# Patient Record
Sex: FEMALE | Race: WHITE | Employment: UNEMPLOYED | URBAN - METROPOLITAN AREA
[De-identification: names, ages, dates, MRNs, and addresses within clinical notes are randomized per-mention and may not be internally consistent; named-entity substitution may affect disease eponyms.]

---

## 2017-09-13 ENCOUNTER — HOSPITAL ENCOUNTER (OUTPATIENT)
Facility: HOSPITAL | Age: 32
Setting detail: OBSERVATION
Discharge: HOME | End: 2017-09-14
Attending: EMERGENCY MEDICINE | Admitting: INTERNAL MEDICINE
Payer: MEDICAID

## 2017-09-13 ENCOUNTER — APPOINTMENT (OUTPATIENT)
Dept: GENERAL RADIOLOGY | Age: 32
End: 2017-09-13
Attending: EMERGENCY MEDICINE
Payer: MEDICAID

## 2017-09-13 DIAGNOSIS — R07.9 CHEST PAIN, RULE OUT ACUTE MYOCARDIAL INFARCTION: Primary | ICD-10-CM

## 2017-09-13 LAB
ALBUMIN SERPL-MCNC: 3.8 G/DL (ref 3.5–4.8)
ALP LIVER SERPL-CCNC: 62 U/L (ref 37–98)
ALT SERPL-CCNC: 20 U/L (ref 14–54)
AST SERPL-CCNC: 13 U/L (ref 15–41)
BASOPHILS # BLD AUTO: 0.04 X10(3) UL (ref 0–0.1)
BASOPHILS NFR BLD AUTO: 0.8 %
BILIRUB SERPL-MCNC: 0.5 MG/DL (ref 0.1–2)
BUN BLD-MCNC: 14 MG/DL (ref 8–20)
CALCIUM BLD-MCNC: 9.3 MG/DL (ref 8.3–10.3)
CHLORIDE: 105 MMOL/L (ref 101–111)
CO2: 28 MMOL/L (ref 22–32)
CREAT BLD-MCNC: 0.71 MG/DL (ref 0.55–1.02)
D-DIMER: 0.39 UG/ML FEU (ref 0–0.49)
EOSINOPHIL # BLD AUTO: 0.12 X10(3) UL (ref 0–0.3)
EOSINOPHIL NFR BLD AUTO: 2.4 %
ERYTHROCYTE [DISTWIDTH] IN BLOOD BY AUTOMATED COUNT: 14.1 % (ref 11.5–16)
GLUCOSE BLD-MCNC: 89 MG/DL (ref 70–99)
HCT VFR BLD AUTO: 40.6 % (ref 34–50)
HGB BLD-MCNC: 13.5 G/DL (ref 12–16)
IMMATURE GRANULOCYTE COUNT: 0.03 X10(3) UL (ref 0–1)
IMMATURE GRANULOCYTE RATIO %: 0.6 %
LYMPHOCYTES # BLD AUTO: 1.9 X10(3) UL (ref 0.9–4)
LYMPHOCYTES NFR BLD AUTO: 38.5 %
M PROTEIN MFR SERPL ELPH: 7.4 G/DL (ref 6.1–8.3)
MCH RBC QN AUTO: 29.7 PG (ref 27–33.2)
MCHC RBC AUTO-ENTMCNC: 33.3 G/DL (ref 31–37)
MCV RBC AUTO: 89.4 FL (ref 81–100)
MONOCYTES # BLD AUTO: 0.56 X10(3) UL (ref 0.1–0.6)
MONOCYTES NFR BLD AUTO: 11.3 %
NEUTROPHIL ABS PRELIM: 2.29 X10 (3) UL (ref 1.3–6.7)
NEUTROPHILS # BLD AUTO: 2.29 X10(3) UL (ref 1.3–6.7)
NEUTROPHILS NFR BLD AUTO: 46.4 %
PLATELET # BLD AUTO: 254 10(3)UL (ref 150–450)
POCT LOT NUMBER: NORMAL
POCT URINE PREGNANCY: NEGATIVE
POTASSIUM SERPL-SCNC: 3.9 MMOL/L (ref 3.6–5.1)
RBC # BLD AUTO: 4.54 X10(6)UL (ref 3.8–5.1)
RED CELL DISTRIBUTION WIDTH-SD: 46.3 FL (ref 35.1–46.3)
SODIUM SERPL-SCNC: 139 MMOL/L (ref 136–144)
TROPONIN: <0.046 NG/ML (ref ?–0.05)
WBC # BLD AUTO: 4.9 X10(3) UL (ref 4–13)

## 2017-09-13 PROCEDURE — 71020 XR CHEST PA + LAT CHEST (CPT=71020): CPT | Performed by: EMERGENCY MEDICINE

## 2017-09-13 RX ORDER — KETOROLAC TROMETHAMINE 30 MG/ML
30 INJECTION, SOLUTION INTRAMUSCULAR; INTRAVENOUS ONCE
Status: COMPLETED | OUTPATIENT
Start: 2017-09-13 | End: 2017-09-13

## 2017-09-13 RX ORDER — OMEGA-3-ACID ETHYL ESTERS 1 G/1
1 CAPSULE, LIQUID FILLED ORAL DAILY
COMMUNITY
End: 2018-08-16

## 2017-09-14 ENCOUNTER — APPOINTMENT (OUTPATIENT)
Dept: CV DIAGNOSTICS | Facility: HOSPITAL | Age: 32
End: 2017-09-14
Attending: INTERNAL MEDICINE
Payer: MEDICAID

## 2017-09-14 VITALS
HEIGHT: 66 IN | HEART RATE: 53 BPM | RESPIRATION RATE: 16 BRPM | OXYGEN SATURATION: 96 % | WEIGHT: 154.13 LBS | TEMPERATURE: 98 F | SYSTOLIC BLOOD PRESSURE: 93 MMHG | DIASTOLIC BLOOD PRESSURE: 52 MMHG | BODY MASS INDEX: 24.77 KG/M2

## 2017-09-14 LAB
ATRIAL RATE: 80 BPM
BASOPHILS # BLD AUTO: 0.04 X10(3) UL (ref 0–0.1)
BASOPHILS NFR BLD AUTO: 1 %
BUN BLD-MCNC: 13 MG/DL (ref 8–20)
CALCIUM BLD-MCNC: 8.5 MG/DL (ref 8.3–10.3)
CHLORIDE: 110 MMOL/L (ref 101–111)
CHOLEST SMN-MCNC: 142 MG/DL (ref ?–200)
CO2: 25 MMOL/L (ref 22–32)
CREAT BLD-MCNC: 0.59 MG/DL (ref 0.55–1.02)
EOSINOPHIL # BLD AUTO: 0.16 X10(3) UL (ref 0–0.3)
EOSINOPHIL NFR BLD AUTO: 3.9 %
ERYTHROCYTE [DISTWIDTH] IN BLOOD BY AUTOMATED COUNT: 14 % (ref 11.5–16)
GLUCOSE BLD-MCNC: 84 MG/DL (ref 70–99)
HAV IGM SER QL: 2.3 MG/DL (ref 1.7–3)
HCT VFR BLD AUTO: 36.2 % (ref 34–50)
HDLC SERPL-MCNC: 36 MG/DL (ref 45–?)
HDLC SERPL: 3.94 {RATIO} (ref ?–4.44)
HGB BLD-MCNC: 11.9 G/DL (ref 12–16)
IMMATURE GRANULOCYTE COUNT: 0.01 X10(3) UL (ref 0–1)
IMMATURE GRANULOCYTE RATIO %: 0.2 %
LDLC SERPL CALC-MCNC: 89 MG/DL (ref ?–130)
LDLC SERPL-MCNC: 17 MG/DL (ref 5–40)
LYMPHOCYTES # BLD AUTO: 1.85 X10(3) UL (ref 0.9–4)
LYMPHOCYTES NFR BLD AUTO: 45.6 %
MCH RBC QN AUTO: 29.5 PG (ref 27–33.2)
MCHC RBC AUTO-ENTMCNC: 32.9 G/DL (ref 31–37)
MCV RBC AUTO: 89.8 FL (ref 81–100)
MONOCYTES # BLD AUTO: 0.57 X10(3) UL (ref 0.1–0.6)
MONOCYTES NFR BLD AUTO: 14 %
NEUTROPHIL ABS PRELIM: 1.43 X10 (3) UL (ref 1.3–6.7)
NEUTROPHILS # BLD AUTO: 1.43 X10(3) UL (ref 1.3–6.7)
NEUTROPHILS NFR BLD AUTO: 35.3 %
NONHDLC SERPL-MCNC: 106 MG/DL (ref ?–130)
P AXIS: 62 DEGREES
P-R INTERVAL: 144 MS
PLATELET # BLD AUTO: 218 10(3)UL (ref 150–450)
POTASSIUM SERPL-SCNC: 3.6 MMOL/L (ref 3.6–5.1)
Q-T INTERVAL: 366 MS
QRS DURATION: 90 MS
QTC CALCULATION (BEZET): 422 MS
R AXIS: 59 DEGREES
RBC # BLD AUTO: 4.03 X10(6)UL (ref 3.8–5.1)
RED CELL DISTRIBUTION WIDTH-SD: 46 FL (ref 35.1–46.3)
SODIUM SERPL-SCNC: 142 MMOL/L (ref 136–144)
T AXIS: 7 DEGREES
TRIGLYCERIDES: 83 MG/DL (ref ?–150)
TROPONIN: <0.046 NG/ML (ref ?–0.05)
VENTRICULAR RATE: 80 BPM
WBC # BLD AUTO: 4.1 X10(3) UL (ref 4–13)

## 2017-09-14 PROCEDURE — 93306 TTE W/DOPPLER COMPLETE: CPT | Performed by: INTERNAL MEDICINE

## 2017-09-14 PROCEDURE — 99219 INITIAL OBSERVATION CARE,LEVL II: CPT | Performed by: INTERNAL MEDICINE

## 2017-09-14 PROCEDURE — 93018 CV STRESS TEST I&R ONLY: CPT | Performed by: INTERNAL MEDICINE

## 2017-09-14 PROCEDURE — 93350 STRESS TTE ONLY: CPT | Performed by: INTERNAL MEDICINE

## 2017-09-14 PROCEDURE — 93017 CV STRESS TEST TRACING ONLY: CPT | Performed by: INTERNAL MEDICINE

## 2017-09-14 RX ORDER — KETOROLAC TROMETHAMINE 30 MG/ML
30 INJECTION, SOLUTION INTRAMUSCULAR; INTRAVENOUS EVERY 6 HOURS PRN
Status: DISCONTINUED | OUTPATIENT
Start: 2017-09-14 | End: 2017-09-14

## 2017-09-14 RX ORDER — POTASSIUM CHLORIDE 20 MEQ/1
40 TABLET, EXTENDED RELEASE ORAL EVERY 4 HOURS
Status: DISCONTINUED | OUTPATIENT
Start: 2017-09-14 | End: 2017-09-14

## 2017-09-14 RX ORDER — ASPIRIN 325 MG
325 TABLET ORAL DAILY
Status: DISCONTINUED | OUTPATIENT
Start: 2017-09-14 | End: 2017-09-14

## 2017-09-14 RX ORDER — KETOROLAC TROMETHAMINE 30 MG/ML
15 INJECTION, SOLUTION INTRAMUSCULAR; INTRAVENOUS EVERY 6 HOURS PRN
Status: DISCONTINUED | OUTPATIENT
Start: 2017-09-14 | End: 2017-09-14

## 2017-09-14 RX ORDER — ENOXAPARIN SODIUM 100 MG/ML
40 INJECTION SUBCUTANEOUS DAILY
Status: DISCONTINUED | OUTPATIENT
Start: 2017-09-14 | End: 2017-09-14

## 2017-09-14 RX ORDER — ONDANSETRON 2 MG/ML
4 INJECTION INTRAMUSCULAR; INTRAVENOUS EVERY 6 HOURS PRN
Status: DISCONTINUED | OUTPATIENT
Start: 2017-09-14 | End: 2017-09-14

## 2017-09-14 RX ORDER — NITROGLYCERIN 0.4 MG/1
0.4 TABLET SUBLINGUAL EVERY 5 MIN PRN
Status: DISCONTINUED | OUTPATIENT
Start: 2017-09-14 | End: 2017-09-14

## 2017-09-14 NOTE — CONSULTS
[facility]  Report of Consultation    Jayshree Merchant Patient Status:  Observation    3/13/1985 MRN [id number]   [facility] 2NE-A Attending Rylan Pearson MD   Hosp Day # 0 PCP Angel Reyes     Reason for Consultation:  Chest pain, dysp (36.8 °C), temperature source Oral, resp. rate 16, height 5' 6\" (1.676 m), weight 154 lb 1.6 oz (69.9 kg), last menstrual period 2017, SpO2 97 %, not currently breastfeeding.   Temp (24hrs), Av.2 °F (36.8 °C), Min:98 °F (36.7 °C), Max:98.3 °F (36 89 09/13/2017   CA 9.3 09/13/2017   ALB 3.8 09/13/2017   ALKPHO 62 09/13/2017   BILT 0.5 09/13/2017   TP 7.4 09/13/2017   AST 13 09/13/2017   ALT 20 09/13/2017   DDIMER 0.39 09/13/2017   TROP <0.046 09/13/2017         Impression:  1.  Atypical intermittent

## 2017-09-14 NOTE — PROGRESS NOTES
Echo and Stress echo complete. Pt exercised 11:00 min on a Maxx protocol achieving 86% of APMHR. 1 mm ST depression noted which resolved immediately in recovery. No arrhythmias noted. SE given to Dr. Manju Guillen to read. Pt brought back to room via BRIDGER Lee RN

## 2017-09-14 NOTE — ED PROVIDER NOTES
Patient Seen in: [facility] In [place]    History   Patient presents with:  Chest Pain Angina (cardiovascular)  Dyspnea ZAKI SOB (respiratory)    Stated Complaint: chest pain - heaviness to left arm    HPI    81-year-old white female w Ht 167.6 cm (5' 6\")   Wt 68.9 kg   LMP 09/04/2017   SpO2 100%   Breastfeeding?  Unknown   BMI 24.53 kg/m²         Physical Exam    Well-developed well-nourished female who is sitting on the gurney she is awake and alert and appears in no acute discomfort o orders were created for panel order CBC WITH DIFFERENTIAL WITH PLATELET.   Procedure                               Abnormality         Status                     ---------                               -----------         ------                     CBC W/ D encounter diagnosis)    Disposition:  Admit    Follow-up:  No follow-up provider specified.     Medications Prescribed:  Current Discharge Medication List        Present on Admission           ICD-10-CM Noted POA    Chest pain, rule out acute myocardial inf

## 2017-09-14 NOTE — ED INITIAL ASSESSMENT (HPI)
Pt c/o mid chest pain that radiates to left arm x 3 days. + SOB at times. Denies nausea, vomiting. + dizziness.

## 2017-09-14 NOTE — H&P
[facility]                                                               History & Physical         Marc Solis Patient Status:  Observation    3/13/1985 MRN [id number]   [facility] 2NE-A Attending Candelaria García MD   [address] Day # 0 P Medications:    Prescriptions Prior to Admission:  Omega-3-acid Ethyl Esters 1 g Oral Cap Take 1 g by mouth daily. Disp:  Rfl:  Taking   Multiple Vitamin (CALCIUM COMPLEX OR) Take by mouth.  Disp:  Rfl:  Taking   Magnesium Chloride (MAGNESIUM DR OR) Take by 40.6 09/13/2017   .0 09/13/2017   CREATSERUM 0.71 09/13/2017   BUN 14 09/13/2017    09/13/2017   K 3.9 09/13/2017    09/13/2017   CO2 28.0 09/13/2017   GLU 89 09/13/2017   CA 9.3 09/13/2017   ALB 3.8 09/13/2017   ALKPHO 62 09/13/2017   B

## 2017-09-14 NOTE — PLAN OF CARE
CARDIOVASCULAR - ADULT    • Maintains optimal cardiac output and hemodynamic stability Adequate for Discharge    • Absence of cardiac arrhythmias or at baseline Adequate for Discharge        MUSCULOSKELETAL - ADULT    • Return mobility to safest level of f

## 2017-12-22 ENCOUNTER — OFFICE VISIT (OUTPATIENT)
Dept: OBGYN CLINIC | Facility: CLINIC | Age: 32
End: 2017-12-22

## 2017-12-22 ENCOUNTER — LAB ENCOUNTER (OUTPATIENT)
Dept: LAB | Age: 32
End: 2017-12-22
Attending: OBSTETRICS & GYNECOLOGY
Payer: MEDICAID

## 2017-12-22 VITALS
HEART RATE: 88 BPM | HEIGHT: 67 IN | BODY MASS INDEX: 25.11 KG/M2 | DIASTOLIC BLOOD PRESSURE: 70 MMHG | SYSTOLIC BLOOD PRESSURE: 122 MMHG | WEIGHT: 160 LBS

## 2017-12-22 DIAGNOSIS — Z34.81 PRENATAL CARE, SUBSEQUENT PREGNANCY IN FIRST TRIMESTER: ICD-10-CM

## 2017-12-22 DIAGNOSIS — Z3A.08 8 WEEKS GESTATION OF PREGNANCY: ICD-10-CM

## 2017-12-22 DIAGNOSIS — Z34.00 INITIAL OBSTETRIC VISIT, ANTEPARTUM: ICD-10-CM

## 2017-12-22 DIAGNOSIS — Z34.81 PRENATAL CARE, SUBSEQUENT PREGNANCY IN FIRST TRIMESTER: Primary | ICD-10-CM

## 2017-12-22 PROCEDURE — 86850 RBC ANTIBODY SCREEN: CPT | Performed by: OBSTETRICS & GYNECOLOGY

## 2017-12-22 PROCEDURE — 0500F INITIAL PRENATAL CARE VISIT: CPT | Performed by: OBSTETRICS & GYNECOLOGY

## 2017-12-22 PROCEDURE — 86762 RUBELLA ANTIBODY: CPT | Performed by: OBSTETRICS & GYNECOLOGY

## 2017-12-22 PROCEDURE — 81003 URINALYSIS AUTO W/O SCOPE: CPT | Performed by: OBSTETRICS & GYNECOLOGY

## 2017-12-22 PROCEDURE — 85025 COMPLETE CBC W/AUTO DIFF WBC: CPT | Performed by: OBSTETRICS & GYNECOLOGY

## 2017-12-22 PROCEDURE — 86901 BLOOD TYPING SEROLOGIC RH(D): CPT | Performed by: OBSTETRICS & GYNECOLOGY

## 2017-12-22 PROCEDURE — 86780 TREPONEMA PALLIDUM: CPT | Performed by: OBSTETRICS & GYNECOLOGY

## 2017-12-22 PROCEDURE — 87389 HIV-1 AG W/HIV-1&-2 AB AG IA: CPT | Performed by: OBSTETRICS & GYNECOLOGY

## 2017-12-22 PROCEDURE — 87340 HEPATITIS B SURFACE AG IA: CPT | Performed by: OBSTETRICS & GYNECOLOGY

## 2017-12-22 PROCEDURE — 88175 CYTOPATH C/V AUTO FLUID REDO: CPT | Performed by: OBSTETRICS & GYNECOLOGY

## 2017-12-22 PROCEDURE — 86900 BLOOD TYPING SEROLOGIC ABO: CPT | Performed by: OBSTETRICS & GYNECOLOGY

## 2017-12-22 PROCEDURE — 87086 URINE CULTURE/COLONY COUNT: CPT | Performed by: OBSTETRICS & GYNECOLOGY

## 2017-12-22 NOTE — PROGRESS NOTES
New OB  Denies HSV, MRSA, blood transfusion  - 1st pregnancy termination - trisomy 25  - counsyl after 10 weeks

## 2017-12-27 DIAGNOSIS — Z34.81 PRENATAL CARE, SUBSEQUENT PREGNANCY, FIRST TRIMESTER: Primary | ICD-10-CM

## 2017-12-28 ENCOUNTER — TELEPHONE (OUTPATIENT)
Dept: OBGYN CLINIC | Facility: CLINIC | Age: 32
End: 2017-12-28

## 2017-12-28 NOTE — TELEPHONE ENCOUNTER
Submitted prior auth request via [employer] for First OB ultrasound - currently sitting with clinical review

## 2018-01-03 NOTE — TELEPHONE ENCOUNTER
First OB ultrasound has been denied by [employer]. The following two conditions must be met:  The doctor is unable to cearly measure the size of the womb on pelvic exam, and the date of the last period is unknown or periods have been irregular over the past ye

## 2018-01-12 NOTE — TELEPHONE ENCOUNTER
lashawn is requesting member delegation form in order to consider appeal - attempted to reach patient to ask her to come into the office to sign the form, no voicemail set up, will try again later.

## 2018-01-17 NOTE — TELEPHONE ENCOUNTER
Spoke with patient regarding our attempt to obtain authorization for FOB ultrasound, patient states that with her last baby she did not have FOB ultrasound and she is OK waiting until the 20 week ultrasound.  Patient also states that she called her old OB d

## 2018-01-19 ENCOUNTER — TELEPHONE (OUTPATIENT)
Dept: OBGYN CLINIC | Facility: CLINIC | Age: 33
End: 2018-01-19

## 2018-05-03 ENCOUNTER — LAB ENCOUNTER (OUTPATIENT)
Dept: LAB | Facility: HOSPITAL | Age: 33
End: 2018-05-03
Attending: FAMILY MEDICINE
Payer: MEDICAID

## 2018-05-03 DIAGNOSIS — Z3A.25 25 WEEKS GESTATION OF PREGNANCY: Primary | ICD-10-CM

## 2018-05-03 PROCEDURE — 82950 GLUCOSE TEST: CPT

## 2018-05-03 PROCEDURE — 36415 COLL VENOUS BLD VENIPUNCTURE: CPT

## 2018-08-04 ENCOUNTER — APPOINTMENT (OUTPATIENT)
Dept: ULTRASOUND IMAGING | Facility: HOSPITAL | Age: 33
End: 2018-08-04
Attending: EMERGENCY MEDICINE
Payer: MEDICAID

## 2018-08-04 ENCOUNTER — HOSPITAL ENCOUNTER (EMERGENCY)
Facility: HOSPITAL | Age: 33
Discharge: HOME | End: 2018-08-04
Attending: EMERGENCY MEDICINE
Payer: MEDICAID

## 2018-08-04 ENCOUNTER — APPOINTMENT (OUTPATIENT)
Dept: GENERAL RADIOLOGY | Facility: HOSPITAL | Age: 33
End: 2018-08-04
Payer: MEDICAID

## 2018-08-04 VITALS
HEART RATE: 68 BPM | RESPIRATION RATE: 17 BRPM | BODY MASS INDEX: 33.75 KG/M2 | OXYGEN SATURATION: 95 % | HEIGHT: 66 IN | SYSTOLIC BLOOD PRESSURE: 106 MMHG | DIASTOLIC BLOOD PRESSURE: 63 MMHG | TEMPERATURE: 98 F | WEIGHT: 210 LBS

## 2018-08-04 DIAGNOSIS — R10.13 EPIGASTRIC PAIN: Primary | ICD-10-CM

## 2018-08-04 DIAGNOSIS — K80.20 CALCULUS OF GALLBLADDER WITHOUT CHOLECYSTITIS WITHOUT OBSTRUCTION: ICD-10-CM

## 2018-08-04 LAB
ALBUMIN SERPL-MCNC: 3.3 G/DL (ref 3.5–4.8)
ALBUMIN/GLOB SERPL: 0.9 {RATIO} (ref 1–2)
ALP LIVER SERPL-CCNC: 103 U/L (ref 37–98)
ALT SERPL-CCNC: 61 U/L (ref 14–54)
ANION GAP SERPL CALC-SCNC: 9 MMOL/L (ref 0–18)
AST SERPL-CCNC: 90 U/L (ref 15–41)
ATRIAL RATE: 58 BPM
BASOPHILS # BLD AUTO: 0.04 X10(3) UL (ref 0–0.1)
BASOPHILS NFR BLD AUTO: 0.4 %
BILIRUB SERPL-MCNC: 1.1 MG/DL (ref 0.1–2)
BUN BLD-MCNC: 15 MG/DL (ref 8–20)
BUN/CREAT SERPL: 22.7 (ref 10–20)
CALCIUM BLD-MCNC: 9 MG/DL (ref 8.3–10.3)
CHLORIDE SERPL-SCNC: 106 MMOL/L (ref 101–111)
CO2 SERPL-SCNC: 26 MMOL/L (ref 22–32)
CREAT BLD-MCNC: 0.66 MG/DL (ref 0.55–1.02)
EOSINOPHIL # BLD AUTO: 0.11 X10(3) UL (ref 0–0.3)
EOSINOPHIL NFR BLD AUTO: 1.1 %
ERYTHROCYTE [DISTWIDTH] IN BLOOD BY AUTOMATED COUNT: 13.9 % (ref 11.5–16)
GLOBULIN PLAS-MCNC: 3.6 G/DL (ref 2.5–3.7)
GLUCOSE BLD-MCNC: 104 MG/DL (ref 70–99)
HCT VFR BLD AUTO: 37.4 % (ref 34–50)
HGB BLD-MCNC: 12.7 G/DL (ref 12–16)
IMMATURE GRANULOCYTE COUNT: 0.03 X10(3) UL (ref 0–1)
IMMATURE GRANULOCYTE RATIO %: 0.3 %
LIPASE: 158 U/L (ref 73–393)
LYMPHOCYTES # BLD AUTO: 1.54 X10(3) UL (ref 0.9–4)
LYMPHOCYTES NFR BLD AUTO: 15.5 %
M PROTEIN MFR SERPL ELPH: 6.9 G/DL (ref 6.1–8.3)
MCH RBC QN AUTO: 30 PG (ref 27–33.2)
MCHC RBC AUTO-ENTMCNC: 34 G/DL (ref 31–37)
MCV RBC AUTO: 88.2 FL (ref 81–100)
MONOCYTES # BLD AUTO: 0.59 X10(3) UL (ref 0.1–1)
MONOCYTES NFR BLD AUTO: 5.9 %
NEUTROPHIL ABS PRELIM: 7.64 X10 (3) UL (ref 1.3–6.7)
NEUTROPHILS # BLD AUTO: 7.64 X10(3) UL (ref 1.3–6.7)
NEUTROPHILS NFR BLD AUTO: 76.8 %
OSMOLALITY SERPL CALC.SUM OF ELEC: 293 MOSM/KG (ref 275–295)
P AXIS: 24 DEGREES
P-R INTERVAL: 140 MS
PLATELET # BLD AUTO: 325 10(3)UL (ref 150–450)
POTASSIUM SERPL-SCNC: 3.4 MMOL/L (ref 3.6–5.1)
Q-T INTERVAL: 436 MS
QRS DURATION: 92 MS
QTC CALCULATION (BEZET): 428 MS
R AXIS: 52 DEGREES
RBC # BLD AUTO: 4.24 X10(6)UL (ref 3.8–5.1)
RED CELL DISTRIBUTION WIDTH-SD: 44.5 FL (ref 35.1–46.3)
SODIUM SERPL-SCNC: 141 MMOL/L (ref 136–144)
T AXIS: 26 DEGREES
TROPONIN I SERPL-MCNC: <0.046 NG/ML (ref ?–0.05)
VENTRICULAR RATE: 58 BPM
WBC # BLD AUTO: 10 X10(3) UL (ref 4–13)

## 2018-08-04 PROCEDURE — 83690 ASSAY OF LIPASE: CPT | Performed by: EMERGENCY MEDICINE

## 2018-08-04 PROCEDURE — 96374 THER/PROPH/DIAG INJ IV PUSH: CPT

## 2018-08-04 PROCEDURE — 85025 COMPLETE CBC W/AUTO DIFF WBC: CPT | Performed by: EMERGENCY MEDICINE

## 2018-08-04 PROCEDURE — 93005 ELECTROCARDIOGRAM TRACING: CPT

## 2018-08-04 PROCEDURE — 99285 EMERGENCY DEPT VISIT HI MDM: CPT

## 2018-08-04 PROCEDURE — 96375 TX/PRO/DX INJ NEW DRUG ADDON: CPT

## 2018-08-04 PROCEDURE — 76700 US EXAM ABDOM COMPLETE: CPT | Performed by: EMERGENCY MEDICINE

## 2018-08-04 PROCEDURE — 71045 X-RAY EXAM CHEST 1 VIEW: CPT | Performed by: EMERGENCY MEDICINE

## 2018-08-04 PROCEDURE — 80053 COMPREHEN METABOLIC PANEL: CPT | Performed by: EMERGENCY MEDICINE

## 2018-08-04 PROCEDURE — 84484 ASSAY OF TROPONIN QUANT: CPT | Performed by: EMERGENCY MEDICINE

## 2018-08-04 PROCEDURE — 93010 ELECTROCARDIOGRAM REPORT: CPT

## 2018-08-04 RX ORDER — PANTOPRAZOLE SODIUM 40 MG/1
40 TABLET, DELAYED RELEASE ORAL DAILY
Qty: 30 TABLET | Refills: 0 | Status: SHIPPED | OUTPATIENT
Start: 2018-08-04 | End: 2018-09-03

## 2018-08-04 RX ORDER — HYDROCODONE BITARTRATE AND ACETAMINOPHEN 5; 325 MG/1; MG/1
1-2 TABLET ORAL EVERY 4 HOURS PRN
Qty: 20 TABLET | Refills: 0 | Status: SHIPPED | OUTPATIENT
Start: 2018-08-04 | End: 2018-08-07

## 2018-08-04 RX ORDER — DICYCLOMINE HCL 20 MG
20 TABLET ORAL 4 TIMES DAILY PRN
Qty: 30 TABLET | Refills: 0 | Status: SHIPPED | OUTPATIENT
Start: 2018-08-04 | End: 2018-08-07

## 2018-08-04 RX ORDER — METOCLOPRAMIDE 10 MG/1
10 TABLET ORAL 3 TIMES DAILY PRN
Qty: 20 TABLET | Refills: 0 | Status: SHIPPED | OUTPATIENT
Start: 2018-08-04 | End: 2018-08-07

## 2018-08-04 RX ORDER — MAGNESIUM HYDROXIDE/ALUMINUM HYDROXICE/SIMETHICONE 120; 1200; 1200 MG/30ML; MG/30ML; MG/30ML
30 SUSPENSION ORAL ONCE
Status: COMPLETED | OUTPATIENT
Start: 2018-08-04 | End: 2018-08-04

## 2018-08-04 RX ORDER — DIPHENHYDRAMINE HYDROCHLORIDE 50 MG/ML
25 INJECTION INTRAMUSCULAR; INTRAVENOUS ONCE
Status: COMPLETED | OUTPATIENT
Start: 2018-08-04 | End: 2018-08-04

## 2018-08-04 RX ORDER — KETOROLAC TROMETHAMINE 30 MG/ML
30 INJECTION, SOLUTION INTRAMUSCULAR; INTRAVENOUS ONCE
Status: COMPLETED | OUTPATIENT
Start: 2018-08-04 | End: 2018-08-04

## 2018-08-04 RX ORDER — METOCLOPRAMIDE HYDROCHLORIDE 5 MG/ML
10 INJECTION INTRAMUSCULAR; INTRAVENOUS ONCE
Status: COMPLETED | OUTPATIENT
Start: 2018-08-04 | End: 2018-08-04

## 2018-08-04 NOTE — ED NOTES
Pt c/o mid chest pain that radiates to back x 3 hours. Pt states feels like a pressure. Pt denies any recent spicy food. Pt had NVD 7/18.

## 2018-08-04 NOTE — ED PROVIDER NOTES
Patient Seen in: [facility] Emergency Department    History   Patient presents with:  Chest Pain Angina (cardiovascular)    Stated Complaint: chest pain    HPI    27-year-old female presents to the emerge part with complaints of epigastric pain.   Pa Physical Exam   Constitutional: She is oriented to person, place, and time. She appears well-developed and well-nourished. HENT:   Head: Normocephalic and atraumatic.    Mouth/Throat: Oropharynx is clear and moist.   Eyes: EOM are normal. Pupils a -----------         ------                     CBC W/ DIFFERENTIAL[[id number]]          Abnormal            Final result                 Please view results for these tests on the individual orders.    [address] [facility] fever or worsening pain she is to return to the emerge [facility] for possible admission at that time. She otherwise should follow-up with the general surgeon.   She is discharged in good condition        Disposition and Plan     Clinical Impression:  Epigast

## 2018-08-07 ENCOUNTER — OFFICE VISIT (OUTPATIENT)
Dept: SURGERY | Facility: CLINIC | Age: 33
End: 2018-08-07
Payer: MEDICAID

## 2018-08-07 VITALS
TEMPERATURE: 98 F | BODY MASS INDEX: 33.75 KG/M2 | HEART RATE: 68 BPM | HEIGHT: 66 IN | DIASTOLIC BLOOD PRESSURE: 70 MMHG | WEIGHT: 210 LBS | SYSTOLIC BLOOD PRESSURE: 103 MMHG

## 2018-08-07 DIAGNOSIS — K80.20 CHOLELITHIASIS WITHOUT CHOLECYSTITIS: Primary | ICD-10-CM

## 2018-08-07 PROCEDURE — 99243 OFF/OP CNSLTJ NEW/EST LOW 30: CPT | Performed by: SURGERY

## 2018-08-07 NOTE — H&P
New Patient Visit Note       Active Problems      1. Cholelithiasis without cholecystitis        Chief Complaint   Patient presents with:  Gallbladder: New patient referred by [facility] for gallbladder consult. US abdomen done 8/4/18.  Denies any abominal p Medical History:   Diagnosis Date   • Gestational diabetes      History reviewed. No pertinent surgical history. The family history and social history have been reviewed by me today. History reviewed. No pertinent family history.   Social History    M easily. Psychiatric/Behavioral: Negative for behavioral problems and sleep disturbance.        Physical Findings   /70   Pulse 68   Temp 98.1 °F (36.7 °C) (Oral)   Ht 66\"   Wt 210 lb   LMP 10/25/2017 (Approximate)   BMI 33.89 kg/m²   Physical Exam

## 2018-08-13 ENCOUNTER — TELEPHONE (OUTPATIENT)
Dept: SURGERY | Facility: CLINIC | Age: 33
End: 2018-08-13

## 2018-08-13 DIAGNOSIS — K80.18 CALCULUS OF GALLBLADDER WITH OTHER CHOLECYSTITIS WITHOUT OBSTRUCTION: Primary | ICD-10-CM

## 2018-08-16 NOTE — PAT NURSING NOTE
During PAT screening, patient stated that she has a sore throat with white areas. Patient states she suspects it is strep throat but doesn't think she should take antibiotics prior to surgery. Encouraged patient to call her PCP ASAP regarding above.  Kayley

## 2018-08-21 ENCOUNTER — ANESTHESIA EVENT (OUTPATIENT)
Dept: SURGERY | Facility: HOSPITAL | Age: 33
End: 2018-08-21
Payer: MEDICAID

## 2018-08-21 NOTE — H&P
History of Present Illness      Pt seen at the request of [facility] ER for gallstones. Pt delivered a baby boy 3 weeks ago. Developed upper abdominal pain on 8/4/18 during the day. Pain lasted several hours and then she went to the ER.   Resolved with pain Years of education:                 Number of children:                Social History Main Topics    Smoking status: Former Smoker                                                                Packs/day: 0.00      Years: 10.00       Smokeless tobacco: Nev No distress. Cardiovascular: Normal rate, regular rhythm and normal heart sounds. Pulmonary/Chest: Effort normal and breath sounds normal. No respiratory distress. She has no wheezes. She has no rales. Abdominal: Soft.  Bowel sounds are normal. She e

## 2018-08-22 ENCOUNTER — SURGERY (OUTPATIENT)
Age: 33
End: 2018-08-22

## 2018-08-22 ENCOUNTER — ANESTHESIA (OUTPATIENT)
Dept: SURGERY | Facility: HOSPITAL | Age: 33
End: 2018-08-22
Payer: MEDICAID

## 2018-08-22 ENCOUNTER — HOSPITAL ENCOUNTER (OUTPATIENT)
Facility: HOSPITAL | Age: 33
Setting detail: HOSPITAL OUTPATIENT SURGERY
Discharge: HOME | End: 2018-08-22
Attending: SURGERY | Admitting: SURGERY
Payer: MEDICAID

## 2018-08-22 ENCOUNTER — APPOINTMENT (OUTPATIENT)
Dept: GENERAL RADIOLOGY | Facility: HOSPITAL | Age: 33
End: 2018-08-22
Attending: SURGERY
Payer: MEDICAID

## 2018-08-22 VITALS
TEMPERATURE: 98 F | SYSTOLIC BLOOD PRESSURE: 109 MMHG | RESPIRATION RATE: 18 BRPM | BODY MASS INDEX: 33.45 KG/M2 | DIASTOLIC BLOOD PRESSURE: 59 MMHG | HEIGHT: 66 IN | HEART RATE: 57 BPM | WEIGHT: 208.13 LBS | OXYGEN SATURATION: 95 %

## 2018-08-22 DIAGNOSIS — K80.18 CALCULUS OF GALLBLADDER WITH OTHER CHOLECYSTITIS WITHOUT OBSTRUCTION: ICD-10-CM

## 2018-08-22 LAB
POCT LOT NUMBER: NORMAL
POCT URINE PREGNANCY: NEGATIVE

## 2018-08-22 PROCEDURE — 88304 TISSUE EXAM BY PATHOLOGIST: CPT | Performed by: SURGERY

## 2018-08-22 PROCEDURE — S0028 INJECTION, FAMOTIDINE, 20 MG: HCPCS

## 2018-08-22 PROCEDURE — BF101ZZ FLUOROSCOPY OF BILE DUCTS USING LOW OSMOLAR CONTRAST: ICD-10-PCS | Performed by: SURGERY

## 2018-08-22 PROCEDURE — 81025 URINE PREGNANCY TEST: CPT | Performed by: SURGERY

## 2018-08-22 PROCEDURE — 74300 X-RAY BILE DUCTS/PANCREAS: CPT | Performed by: SURGERY

## 2018-08-22 PROCEDURE — 0FT44ZZ RESECTION OF GALLBLADDER, PERCUTANEOUS ENDOSCOPIC APPROACH: ICD-10-PCS | Performed by: SURGERY

## 2018-08-22 RX ORDER — MEPERIDINE HYDROCHLORIDE 25 MG/ML
12.5 INJECTION INTRAMUSCULAR; INTRAVENOUS; SUBCUTANEOUS AS NEEDED
Status: COMPLETED | OUTPATIENT
Start: 2018-08-22 | End: 2018-08-22

## 2018-08-22 RX ORDER — MIDAZOLAM HYDROCHLORIDE 1 MG/ML
1 INJECTION INTRAMUSCULAR; INTRAVENOUS EVERY 5 MIN PRN
Status: DISCONTINUED | OUTPATIENT
Start: 2018-08-22 | End: 2018-08-22

## 2018-08-22 RX ORDER — HEPARIN SODIUM 5000 [USP'U]/ML
5000 INJECTION, SOLUTION INTRAVENOUS; SUBCUTANEOUS ONCE
Status: COMPLETED | OUTPATIENT
Start: 2018-08-22 | End: 2018-08-22

## 2018-08-22 RX ORDER — METOCLOPRAMIDE HYDROCHLORIDE 5 MG/ML
10 INJECTION INTRAMUSCULAR; INTRAVENOUS AS NEEDED
Status: DISCONTINUED | OUTPATIENT
Start: 2018-08-22 | End: 2018-08-22

## 2018-08-22 RX ORDER — HYDROCODONE BITARTRATE AND ACETAMINOPHEN 5; 325 MG/1; MG/1
1 TABLET ORAL AS NEEDED
Status: COMPLETED | OUTPATIENT
Start: 2018-08-22 | End: 2018-08-22

## 2018-08-22 RX ORDER — LABETALOL HYDROCHLORIDE 5 MG/ML
5 INJECTION, SOLUTION INTRAVENOUS EVERY 5 MIN PRN
Status: DISCONTINUED | OUTPATIENT
Start: 2018-08-22 | End: 2018-08-22

## 2018-08-22 RX ORDER — ACETAMINOPHEN 500 MG
1000 TABLET ORAL ONCE AS NEEDED
Status: DISCONTINUED | OUTPATIENT
Start: 2018-08-22 | End: 2018-08-22

## 2018-08-22 RX ORDER — MORPHINE SULFATE 4 MG/ML
2 INJECTION, SOLUTION INTRAMUSCULAR; INTRAVENOUS EVERY 5 MIN PRN
Status: DISCONTINUED | OUTPATIENT
Start: 2018-08-22 | End: 2018-08-22

## 2018-08-22 RX ORDER — NALOXONE HYDROCHLORIDE 0.4 MG/ML
80 INJECTION, SOLUTION INTRAMUSCULAR; INTRAVENOUS; SUBCUTANEOUS AS NEEDED
Status: DISCONTINUED | OUTPATIENT
Start: 2018-08-22 | End: 2018-08-22

## 2018-08-22 RX ORDER — DEXTROSE MONOHYDRATE 25 G/50ML
50 INJECTION, SOLUTION INTRAVENOUS
Status: DISCONTINUED | OUTPATIENT
Start: 2018-08-22 | End: 2018-08-22

## 2018-08-22 RX ORDER — SODIUM CHLORIDE, SODIUM LACTATE, POTASSIUM CHLORIDE, CALCIUM CHLORIDE 600; 310; 30; 20 MG/100ML; MG/100ML; MG/100ML; MG/100ML
INJECTION, SOLUTION INTRAVENOUS CONTINUOUS
Status: DISCONTINUED | OUTPATIENT
Start: 2018-08-22 | End: 2018-08-22

## 2018-08-22 RX ORDER — BUPIVACAINE HYDROCHLORIDE AND EPINEPHRINE 5; 5 MG/ML; UG/ML
INJECTION, SOLUTION EPIDURAL; INTRACAUDAL; PERINEURAL AS NEEDED
Status: DISCONTINUED | OUTPATIENT
Start: 2018-08-22 | End: 2018-08-22 | Stop reason: HOSPADM

## 2018-08-22 RX ORDER — MEPERIDINE HYDROCHLORIDE 25 MG/ML
INJECTION INTRAMUSCULAR; INTRAVENOUS; SUBCUTANEOUS
Status: COMPLETED
Start: 2018-08-22 | End: 2018-08-22

## 2018-08-22 RX ORDER — DEXAMETHASONE SODIUM PHOSPHATE 4 MG/ML
4 VIAL (ML) INJECTION AS NEEDED
Status: DISCONTINUED | OUTPATIENT
Start: 2018-08-22 | End: 2018-08-22

## 2018-08-22 RX ORDER — HYDROCODONE BITARTRATE AND ACETAMINOPHEN 5; 325 MG/1; MG/1
2 TABLET ORAL AS NEEDED
Status: COMPLETED | OUTPATIENT
Start: 2018-08-22 | End: 2018-08-22

## 2018-08-22 RX ORDER — ACETAMINOPHEN 500 MG
1000 TABLET ORAL ONCE
COMMUNITY
End: 2019-07-23

## 2018-08-22 RX ORDER — HYDROCODONE BITARTRATE AND ACETAMINOPHEN 5; 325 MG/1; MG/1
1-2 TABLET ORAL EVERY 4 HOURS PRN
Qty: 30 TABLET | Refills: 0 | Status: SHIPPED | OUTPATIENT
Start: 2018-08-22 | End: 2018-09-01

## 2018-08-22 RX ORDER — ACETAMINOPHEN 500 MG
1000 TABLET ORAL ONCE
Status: DISCONTINUED | OUTPATIENT
Start: 2018-08-22 | End: 2018-08-22 | Stop reason: HOSPADM

## 2018-08-22 RX ORDER — ONDANSETRON 2 MG/ML
4 INJECTION INTRAMUSCULAR; INTRAVENOUS AS NEEDED
Status: DISCONTINUED | OUTPATIENT
Start: 2018-08-22 | End: 2018-08-22

## 2018-08-22 NOTE — ANESTHESIA POSTPROCEDURE EVALUATION
BRIDGER Estevez 112 Patient Status:  Hospital Outpatient Surgery   Age/Gender 29year old female MRN [id number]   [facility] SURGERY Attending Martin Bledsoe MD   Hosp Day # 0 PCP Fady Villanueva (Inactive)       Anesthesia Post-op

## 2018-08-22 NOTE — ANESTHESIA PREPROCEDURE EVALUATION
PRE-OP EVALUATION    Patient Name: Jayshree Merchant    Pre-op Diagnosis: Calculus of gallbladder with other cholecystitis without obstruction [K80.18]    Procedure(s):  LAPAROSCOPIC CHOLECYSTECTOMY WITH CHOLANGIOGRAM    Surgeon(s) and Role:     * Ronny Chaidez 08/04/2018   CO2 26.0 08/04/2018   BUN 15 08/04/2018   CREATSERUM 0.66 08/04/2018    (H) 08/04/2018   CA 9.0 08/04/2018            Airway      Mallampati: II  Mouth opening: >3 FB  TM distance: > 6 cm  Neck ROM: full Cardiovascular      Rhythm: regu

## 2018-08-22 NOTE — OPERATIVE REPORT
PREOPERATIVE DIAGNOSIS: Cholelithiasis. POSTOPERATIVE DIAGNOSIS: Cholelithiasis.      PROCEDURE PERFORMED: Laparoscopic cholecystectomy with intraoperative cholangiogram.     SURGEON: Belinda Mathis MD       ASSISTANT: BILLIE Dunbar good flow of contrast into the duodenum, common duct, and hepatic radicles. Then, 2 clips were then placed on the proximal aspect of the duct and the duct was transected with scissors.  In a similar fashion, the cystic artery was identified, clipped and div

## 2018-09-04 ENCOUNTER — OFFICE VISIT (OUTPATIENT)
Dept: SURGERY | Facility: CLINIC | Age: 33
End: 2018-09-04

## 2018-09-04 VITALS
SYSTOLIC BLOOD PRESSURE: 116 MMHG | DIASTOLIC BLOOD PRESSURE: 70 MMHG | HEART RATE: 78 BPM | TEMPERATURE: 98 F | BODY MASS INDEX: 34 KG/M2 | WEIGHT: 211 LBS

## 2018-09-04 DIAGNOSIS — K80.20 CHOLELITHIASIS WITHOUT CHOLECYSTITIS: Primary | ICD-10-CM

## 2018-09-04 PROCEDURE — 99024 POSTOP FOLLOW-UP VISIT: CPT | Performed by: SURGERY

## 2018-09-04 NOTE — PROGRESS NOTES
Post Operative Visit Note       Active Problems  1. Cholelithiasis without cholecystitis         Chief Complaint   Patient presents with:  Post-Op: gallbladder on 8- w/Dr. Damion Jimenez          History of Present Illness     Doing well.   Off pain medicine wheezing. Cardiovascular: Negative for chest pain, palpitations and leg swelling. Gastrointestinal: Negative for abdominal distention, abdominal pain, anal bleeding, blood in stool, constipation, diarrhea, nausea and vomiting.    Genitourinary: Renetta Villavicencio

## 2019-03-10 ENCOUNTER — HOSPITAL ENCOUNTER (EMERGENCY)
Facility: HOSPITAL | Age: 34
Discharge: HOME | End: 2019-03-10
Attending: EMERGENCY MEDICINE
Payer: MEDICAID

## 2019-03-10 VITALS
HEART RATE: 63 BPM | HEIGHT: 68.5 IN | OXYGEN SATURATION: 99 % | RESPIRATION RATE: 19 BRPM | BODY MASS INDEX: 26.07 KG/M2 | DIASTOLIC BLOOD PRESSURE: 54 MMHG | TEMPERATURE: 98 F | WEIGHT: 174 LBS | SYSTOLIC BLOOD PRESSURE: 96 MMHG

## 2019-03-10 DIAGNOSIS — T78.40XA ALLERGIC REACTION, INITIAL ENCOUNTER: Primary | ICD-10-CM

## 2019-03-10 PROCEDURE — 96361 HYDRATE IV INFUSION ADD-ON: CPT | Performed by: EMERGENCY MEDICINE

## 2019-03-10 PROCEDURE — 99284 EMERGENCY DEPT VISIT MOD MDM: CPT | Performed by: EMERGENCY MEDICINE

## 2019-03-10 PROCEDURE — S0028 INJECTION, FAMOTIDINE, 20 MG: HCPCS

## 2019-03-10 PROCEDURE — 96374 THER/PROPH/DIAG INJ IV PUSH: CPT | Performed by: EMERGENCY MEDICINE

## 2019-03-10 RX ORDER — FAMOTIDINE 20 MG/1
20 TABLET ORAL 2 TIMES DAILY PRN
Qty: 30 TABLET | Refills: 0 | Status: SHIPPED | OUTPATIENT
Start: 2019-03-10 | End: 2019-04-09

## 2019-03-10 RX ORDER — EPINEPHRINE 0.3 MG/.3ML
0.3 INJECTION SUBCUTANEOUS
Qty: 1 EACH | Refills: 0 | Status: SHIPPED | OUTPATIENT
Start: 2019-03-10 | End: 2019-04-09

## 2019-03-10 RX ORDER — PREDNISONE 20 MG/1
60 TABLET ORAL DAILY
Qty: 15 TABLET | Refills: 0 | Status: SHIPPED | OUTPATIENT
Start: 2019-03-10 | End: 2019-03-15

## 2019-03-10 RX ORDER — FAMOTIDINE 10 MG/ML
INJECTION, SOLUTION INTRAVENOUS
Status: COMPLETED
Start: 2019-03-10 | End: 2019-03-10

## 2019-03-10 RX ORDER — KETOROLAC TROMETHAMINE 30 MG/ML
30 INJECTION, SOLUTION INTRAMUSCULAR; INTRAVENOUS ONCE
Status: COMPLETED | OUTPATIENT
Start: 2019-03-10 | End: 2019-03-10

## 2019-03-10 RX ORDER — DIPHENHYDRAMINE HYDROCHLORIDE 50 MG/ML
INJECTION INTRAMUSCULAR; INTRAVENOUS
Status: COMPLETED
Start: 2019-03-10 | End: 2019-03-10

## 2019-03-10 RX ORDER — METHYLPREDNISOLONE SODIUM SUCCINATE 125 MG/2ML
INJECTION, POWDER, LYOPHILIZED, FOR SOLUTION INTRAMUSCULAR; INTRAVENOUS
Status: COMPLETED
Start: 2019-03-10 | End: 2019-03-10

## 2019-03-10 RX ORDER — PREDNISONE 20 MG/1
TABLET ORAL
Status: DISCONTINUED
Start: 2019-03-10 | End: 2019-03-10 | Stop reason: WASHOUT

## 2019-03-10 NOTE — ED NOTES
Overall pt feels improved. She states she feels like her \"hands are swollen\"  No obvious swelling noticed, but pt feels this.   Dr. Sonja Trujillo notified

## 2019-03-10 NOTE — ED PROVIDER NOTES
Patient Seen in: [facility] Emergency Department    History   Patient presents with:  Numbness Weakness (neurologic)    Stated Complaint: can't feel her whole body, hands are very red    HPI    This is a 23-year-old female who has some chronic back 19   Ht 174 cm (5' 8.5\")   Wt 78.9 kg   SpO2 99%   BMI 26.07 kg/m²         Physical Exam  General: Patient has findings of an severe allergic reaction with diffuse redness to her face, chest, arms, legs.   There is no obvious tongue or lip swelling there i breath to use the EpiPen to not take the medications anymore. She should take Benadryl or Claritin over-the-counter along with Pepcid, prednisone. She is to return if any increasing same pain discomfort dizziness or lightheadedness.   The patient does not

## 2019-03-10 NOTE — ED INITIAL ASSESSMENT (HPI)
Pt here with sudden onset of redness and itching within past hour. Pt did take a medication from \"her country\" for back pain which she has taken safely in past.  She had taken this about 20 minutes prior to itching and redness.

## 2019-03-10 NOTE — ED NOTES
Pt ambulated well. Pt still feeling some effects from Benadryl, but otherwise feels well. Pt is afraid that symptoms will return after she leaves medical staff. Pt reassured and will go over teaching with discharge.   Pt assured she will be given Rx's fo

## 2019-03-16 ENCOUNTER — HOSPITAL ENCOUNTER (EMERGENCY)
Facility: HOSPITAL | Age: 34
Discharge: HOME | End: 2019-03-16
Attending: EMERGENCY MEDICINE
Payer: MEDICAID

## 2019-03-16 ENCOUNTER — APPOINTMENT (OUTPATIENT)
Dept: CT IMAGING | Facility: HOSPITAL | Age: 34
End: 2019-03-16
Attending: EMERGENCY MEDICINE
Payer: MEDICAID

## 2019-03-16 VITALS
HEIGHT: 64 IN | SYSTOLIC BLOOD PRESSURE: 103 MMHG | RESPIRATION RATE: 18 BRPM | TEMPERATURE: 98 F | OXYGEN SATURATION: 96 % | DIASTOLIC BLOOD PRESSURE: 74 MMHG | WEIGHT: 173.94 LBS | BODY MASS INDEX: 29.7 KG/M2 | HEART RATE: 68 BPM

## 2019-03-16 DIAGNOSIS — R42 DIZZINESS: Primary | ICD-10-CM

## 2019-03-16 LAB
ALBUMIN SERPL-MCNC: 3.7 G/DL (ref 3.4–5)
ALBUMIN/GLOB SERPL: 1.1 {RATIO} (ref 1–2)
ALP LIVER SERPL-CCNC: 69 U/L (ref 37–98)
ALT SERPL-CCNC: 38 U/L (ref 13–56)
ANION GAP SERPL CALC-SCNC: 8 MMOL/L (ref 0–18)
AST SERPL-CCNC: 13 U/L (ref 15–37)
BASOPHILS # BLD AUTO: 0.05 X10(3) UL (ref 0–0.2)
BASOPHILS NFR BLD AUTO: 0.5 %
BILIRUB SERPL-MCNC: 0.2 MG/DL (ref 0.1–2)
BILIRUB UR QL STRIP.AUTO: NEGATIVE
BUN BLD-MCNC: 18 MG/DL (ref 7–18)
BUN/CREAT SERPL: 20.5 (ref 10–20)
CALCIUM BLD-MCNC: 8.8 MG/DL (ref 8.5–10.1)
CHLORIDE SERPL-SCNC: 108 MMOL/L (ref 98–107)
CLARITY UR REFRACT.AUTO: CLEAR
CO2 SERPL-SCNC: 26 MMOL/L (ref 21–32)
CREAT BLD-MCNC: 0.88 MG/DL (ref 0.55–1.02)
DEPRECATED RDW RBC AUTO: 47 FL (ref 35.1–46.3)
EOSINOPHIL # BLD AUTO: 0.23 X10(3) UL (ref 0–0.7)
EOSINOPHIL NFR BLD AUTO: 2.2 %
ERYTHROCYTE [DISTWIDTH] IN BLOOD BY AUTOMATED COUNT: 14.6 % (ref 11–15)
EXPIRATION DATE: NORMAL
GLOBULIN PLAS-MCNC: 3.5 G/DL (ref 2.8–4.4)
GLUCOSE BLD-MCNC: 92 MG/DL (ref 70–99)
GLUCOSE UR STRIP.AUTO-MCNC: NEGATIVE MG/DL
HCT VFR BLD AUTO: 42 % (ref 35–48)
HGB BLD-MCNC: 14.3 G/DL (ref 12–16)
IMM GRANULOCYTES # BLD AUTO: 0.06 X10(3) UL (ref 0–1)
IMM GRANULOCYTES NFR BLD: 0.6 %
KETONES UR STRIP.AUTO-MCNC: NEGATIVE MG/DL
LEUKOCYTE ESTERASE UR QL STRIP.AUTO: NEGATIVE
LYMPHOCYTES # BLD AUTO: 5.15 X10(3) UL (ref 1–4)
LYMPHOCYTES NFR BLD AUTO: 49.2 %
M PROTEIN MFR SERPL ELPH: 7.2 G/DL (ref 6.4–8.2)
MCH RBC QN AUTO: 30.6 PG (ref 26–34)
MCHC RBC AUTO-ENTMCNC: 34 G/DL (ref 31–37)
MCV RBC AUTO: 89.9 FL (ref 80–100)
MONOCYTES # BLD AUTO: 0.59 X10(3) UL (ref 0.1–1)
MONOCYTES NFR BLD AUTO: 5.6 %
NEUTROPHILS # BLD AUTO: 4.39 X10 (3) UL (ref 1.5–7.7)
NEUTROPHILS # BLD AUTO: 4.39 X10(3) UL (ref 1.5–7.7)
NEUTROPHILS NFR BLD AUTO: 41.9 %
NITRITE UR QL STRIP.AUTO: NEGATIVE
OSMOLALITY SERPL CALC.SUM OF ELEC: 296 MOSM/KG (ref 275–295)
PH UR STRIP.AUTO: 6 [PH] (ref 4.5–8)
PLATELET # BLD AUTO: 288 10(3)UL (ref 150–450)
POCT URINE PREGNANCY: NEGATIVE
POTASSIUM SERPL-SCNC: 3.3 MMOL/L (ref 3.5–5.1)
PROCEDURE CONTROL: YES
PROT UR STRIP.AUTO-MCNC: NEGATIVE MG/DL
RBC # BLD AUTO: 4.67 X10(6)UL (ref 3.8–5.3)
RBC UR QL AUTO: NEGATIVE
SODIUM SERPL-SCNC: 142 MMOL/L (ref 136–145)
SP GR UR STRIP.AUTO: 1 (ref 1–1.03)
UROBILINOGEN UR STRIP.AUTO-MCNC: <2 MG/DL
WBC # BLD AUTO: 10.5 X10(3) UL (ref 4–11)

## 2019-03-16 PROCEDURE — 70450 CT HEAD/BRAIN W/O DYE: CPT | Performed by: EMERGENCY MEDICINE

## 2019-03-16 PROCEDURE — 81025 URINE PREGNANCY TEST: CPT

## 2019-03-16 PROCEDURE — 80053 COMPREHEN METABOLIC PANEL: CPT | Performed by: EMERGENCY MEDICINE

## 2019-03-16 PROCEDURE — 85025 COMPLETE CBC W/AUTO DIFF WBC: CPT | Performed by: EMERGENCY MEDICINE

## 2019-03-16 PROCEDURE — 96360 HYDRATION IV INFUSION INIT: CPT

## 2019-03-16 PROCEDURE — 93010 ELECTROCARDIOGRAM REPORT: CPT

## 2019-03-16 PROCEDURE — 99285 EMERGENCY DEPT VISIT HI MDM: CPT

## 2019-03-16 PROCEDURE — 93005 ELECTROCARDIOGRAM TRACING: CPT

## 2019-03-16 PROCEDURE — 81003 URINALYSIS AUTO W/O SCOPE: CPT | Performed by: EMERGENCY MEDICINE

## 2019-03-16 RX ORDER — MECLIZINE HYDROCHLORIDE 25 MG/1
25 TABLET ORAL ONCE
Status: COMPLETED | OUTPATIENT
Start: 2019-03-16 | End: 2019-03-16

## 2019-03-16 RX ORDER — MECLIZINE HYDROCHLORIDE 25 MG/1
25 TABLET ORAL 4 TIMES DAILY PRN
Qty: 20 TABLET | Refills: 0 | Status: SHIPPED | OUTPATIENT
Start: 2019-03-16 | End: 2019-03-21

## 2019-03-17 LAB
ATRIAL RATE: 47 BPM
P AXIS: 18 DEGREES
P-R INTERVAL: 140 MS
Q-T INTERVAL: 462 MS
QRS DURATION: 88 MS
QTC CALCULATION (BEZET): 408 MS
R AXIS: 61 DEGREES
T AXIS: 36 DEGREES
VENTRICULAR RATE: 47 BPM

## 2019-03-17 NOTE — ED INITIAL ASSESSMENT (HPI)
Pt states, \"I can't feel my face, I can't breathe\", Pt reports these feelings x 2 hours, pt also reports dizziness.

## 2019-03-17 NOTE — ED PROVIDER NOTES
Patient Seen in: [facility] Emergency Department    History   Patient presents with:  Numbness Weakness (neurologic)    Stated Complaint: weakness; \"face feels weird\", \"out of body experiences\"    HPI    Patient is a 78-year-old female who prese 98 %   O2 Device None (Room air)       Current:/74   Pulse 68   Temp 97.8 °F (36.6 °C) (Temporal)   Resp 18   Ht 162.6 cm (5' 4\")   Wt 78.9 kg   SpO2 96%   BMI 29.86 kg/m²         Physical Exam   Constitutional: She is oriented to person, place, and for these tests on the individual orders.    URINALYSIS WITH CULTURE REFLEX   SCAN SLIDE   PATH COMMENT CBC   RAINBOW DRAW BLUE   RAINBOW DRAW LAVENDER   RAINBOW DRAW LIGHT GREEN   RAINBOW DRAW GOLD     EKG    Rate, intervals and axes as noted on EKG Report Impression:  Dizziness  (primary encounter diagnosis)    Disposition:  Discharge  3/16/2019 10:30 pm    Follow-up:  Dr. Ivy Carranza              Medications Prescribed:  Current Discharge Medication List    START taking these medications    Meclizine HCl 25 MG

## 2019-07-08 ENCOUNTER — APPOINTMENT (OUTPATIENT)
Dept: CT IMAGING | Age: 34
End: 2019-07-08
Attending: EMERGENCY MEDICINE
Payer: MEDICAID

## 2019-07-08 ENCOUNTER — APPOINTMENT (OUTPATIENT)
Dept: GENERAL RADIOLOGY | Age: 34
End: 2019-07-08
Attending: EMERGENCY MEDICINE
Payer: MEDICAID

## 2019-07-08 ENCOUNTER — HOSPITAL ENCOUNTER (EMERGENCY)
Age: 34
Discharge: HOME | End: 2019-07-08
Attending: EMERGENCY MEDICINE
Payer: MEDICAID

## 2019-07-08 VITALS
SYSTOLIC BLOOD PRESSURE: 99 MMHG | OXYGEN SATURATION: 99 % | WEIGHT: 172 LBS | BODY MASS INDEX: 27.64 KG/M2 | HEIGHT: 66 IN | RESPIRATION RATE: 18 BRPM | DIASTOLIC BLOOD PRESSURE: 59 MMHG | TEMPERATURE: 99 F | HEART RATE: 78 BPM

## 2019-07-08 DIAGNOSIS — R20.0 RIGHT FACIAL NUMBNESS: ICD-10-CM

## 2019-07-08 DIAGNOSIS — R42 DIZZINESS: Primary | ICD-10-CM

## 2019-07-08 LAB
ALBUMIN SERPL-MCNC: 3.8 G/DL (ref 3.4–5)
ALBUMIN/GLOB SERPL: 1 {RATIO} (ref 1–2)
ALP LIVER SERPL-CCNC: 106 U/L (ref 37–98)
ALT SERPL-CCNC: 74 U/L (ref 13–56)
ANION GAP SERPL CALC-SCNC: 8 MMOL/L (ref 0–18)
AST SERPL-CCNC: 34 U/L (ref 15–37)
ATRIAL RATE: 75 BPM
BASOPHILS # BLD AUTO: 0.05 X10(3) UL (ref 0–0.2)
BASOPHILS NFR BLD AUTO: 0.7 %
BILIRUB SERPL-MCNC: 0.4 MG/DL (ref 0.1–2)
BILIRUB UR QL STRIP.AUTO: NEGATIVE
BUN BLD-MCNC: 15 MG/DL (ref 7–18)
BUN/CREAT SERPL: 24.2 (ref 10–20)
CALCIUM BLD-MCNC: 9.4 MG/DL (ref 8.5–10.1)
CHLORIDE SERPL-SCNC: 107 MMOL/L (ref 98–112)
CLARITY UR REFRACT.AUTO: CLEAR
CO2 SERPL-SCNC: 24 MMOL/L (ref 21–32)
COLOR UR AUTO: YELLOW
CREAT BLD-MCNC: 0.62 MG/DL (ref 0.55–1.02)
DEPRECATED RDW RBC AUTO: 45.9 FL (ref 35.1–46.3)
EOSINOPHIL # BLD AUTO: 0.12 X10(3) UL (ref 0–0.7)
EOSINOPHIL NFR BLD AUTO: 1.7 %
ERYTHROCYTE [DISTWIDTH] IN BLOOD BY AUTOMATED COUNT: 13.5 % (ref 11–15)
GLOBULIN PLAS-MCNC: 4 G/DL (ref 2.8–4.4)
GLUCOSE BLD-MCNC: 99 MG/DL (ref 70–99)
GLUCOSE UR STRIP.AUTO-MCNC: NEGATIVE MG/DL
HCT VFR BLD AUTO: 46.1 % (ref 35–48)
HGB BLD-MCNC: 15.3 G/DL (ref 12–16)
IMM GRANULOCYTES # BLD AUTO: 0.02 X10(3) UL (ref 0–1)
IMM GRANULOCYTES NFR BLD: 0.3 %
KETONES UR STRIP.AUTO-MCNC: NEGATIVE MG/DL
LYMPHOCYTES # BLD AUTO: 2.41 X10(3) UL (ref 1–4)
LYMPHOCYTES NFR BLD AUTO: 33.5 %
M PROTEIN MFR SERPL ELPH: 7.8 G/DL (ref 6.4–8.2)
MCH RBC QN AUTO: 30.5 PG (ref 26–34)
MCHC RBC AUTO-ENTMCNC: 33.2 G/DL (ref 31–37)
MCV RBC AUTO: 91.8 FL (ref 80–100)
MONOCYTES # BLD AUTO: 0.41 X10(3) UL (ref 0.1–1)
MONOCYTES NFR BLD AUTO: 5.7 %
NEUTROPHILS # BLD AUTO: 4.19 X10 (3) UL (ref 1.5–7.7)
NEUTROPHILS # BLD AUTO: 4.19 X10(3) UL (ref 1.5–7.7)
NEUTROPHILS NFR BLD AUTO: 58.1 %
NITRITE UR QL STRIP.AUTO: NEGATIVE
OSMOLALITY SERPL CALC.SUM OF ELEC: 289 MOSM/KG (ref 275–295)
P AXIS: 62 DEGREES
P-R INTERVAL: 154 MS
PH UR STRIP.AUTO: 7 [PH] (ref 4.5–8)
PLATELET # BLD AUTO: 326 10(3)UL (ref 150–450)
POCT URINE PREGNANCY: NEGATIVE
POTASSIUM SERPL-SCNC: 4.2 MMOL/L (ref 3.5–5.1)
PROT UR STRIP.AUTO-MCNC: NEGATIVE MG/DL
Q-T INTERVAL: 382 MS
QRS DURATION: 84 MS
QTC CALCULATION (BEZET): 426 MS
R AXIS: 60 DEGREES
RBC # BLD AUTO: 5.02 X10(6)UL (ref 3.8–5.3)
SODIUM SERPL-SCNC: 139 MMOL/L (ref 136–145)
SP GR UR STRIP.AUTO: 1.01 (ref 1–1.03)
T AXIS: 51 DEGREES
UROBILINOGEN UR STRIP.AUTO-MCNC: 0.2 MG/DL
VENTRICULAR RATE: 75 BPM
WBC # BLD AUTO: 7.2 X10(3) UL (ref 4–11)

## 2019-07-08 PROCEDURE — 81025 URINE PREGNANCY TEST: CPT

## 2019-07-08 PROCEDURE — 70450 CT HEAD/BRAIN W/O DYE: CPT | Performed by: EMERGENCY MEDICINE

## 2019-07-08 PROCEDURE — 93010 ELECTROCARDIOGRAM REPORT: CPT

## 2019-07-08 PROCEDURE — 81001 URINALYSIS AUTO W/SCOPE: CPT | Performed by: EMERGENCY MEDICINE

## 2019-07-08 PROCEDURE — 93005 ELECTROCARDIOGRAM TRACING: CPT

## 2019-07-08 PROCEDURE — 71045 X-RAY EXAM CHEST 1 VIEW: CPT | Performed by: EMERGENCY MEDICINE

## 2019-07-08 PROCEDURE — 36415 COLL VENOUS BLD VENIPUNCTURE: CPT

## 2019-07-08 PROCEDURE — 99285 EMERGENCY DEPT VISIT HI MDM: CPT

## 2019-07-08 PROCEDURE — 85025 COMPLETE CBC W/AUTO DIFF WBC: CPT | Performed by: EMERGENCY MEDICINE

## 2019-07-08 PROCEDURE — 80053 COMPREHEN METABOLIC PANEL: CPT | Performed by: EMERGENCY MEDICINE

## 2019-07-08 RX ORDER — MECLIZINE HCL 12.5 MG/1
12.5 TABLET ORAL 3 TIMES DAILY PRN
Qty: 20 TABLET | Refills: 0 | Status: SHIPPED | OUTPATIENT
Start: 2019-07-08 | End: 2019-07-23

## 2019-07-08 RX ORDER — LORAZEPAM 2 MG/ML
1 INJECTION INTRAMUSCULAR ONCE
Status: DISCONTINUED | OUTPATIENT
Start: 2019-07-08 | End: 2019-07-08

## 2019-07-08 RX ORDER — ACETAMINOPHEN 500 MG
1000 TABLET ORAL ONCE
Status: COMPLETED | OUTPATIENT
Start: 2019-07-08 | End: 2019-07-08

## 2019-07-08 NOTE — ED PROVIDER NOTES
Patient Seen in: [facility] In [place]    History   Patient presents with:  Numbness Weakness (neurologic)    Stated Complaint: R facial numbness since last night, worse now, \"feels foggy\"    HPI    61-year-old female presents to th ×3  HEENT: Pupils are equal reactive to light. Extra ocular motions are intact. No scleral icterus or conjunctival pallor: Neck is supple without tenderness on palpation. Head is atraumatic normocephalic. Oral mucosa moist.  Tongue is midline.   No post LAVENDER   RAINBOW DRAW LIGHT GREEN   RAINBOW DRAW GOLD   CBC W/ DIFFERENTIAL     EKG    Rate, intervals and axes as noted on EKG Report.   Rate: 75  Rhythm: Sinus Rhythm  Reading: Normal sinus rhythm normal EKG              Ct Brain Or Head (25022)    Resu performed at this time. I explained the risk of patient having acute neurologic injury that may lead to the tingling and numbness in her face as well as her sensation of dizziness though she has no other focal findings on examination.   Patient understood

## 2019-07-08 NOTE — ED NOTES
Pt unable to tolerate MRI. Pt brought back into ED via BRIDGER Robles. Dr. Juan Luu notified and aware.

## 2019-07-08 NOTE — ED INITIAL ASSESSMENT (HPI)
Pt c/o right facial numbness and tingling that started last night at 2200. Pt states, 'I have a little headache. I get headaches when I don't drink my coffee\".  Pt follows all commands, A/O x 4, hand grasps equal, face symmetrical, pupils equal and reacti

## 2019-07-13 ENCOUNTER — HOSPITAL ENCOUNTER (EMERGENCY)
Facility: HOSPITAL | Age: 34
Discharge: HOME | End: 2019-07-13
Attending: EMERGENCY MEDICINE
Payer: MEDICAID

## 2019-07-13 ENCOUNTER — APPOINTMENT (OUTPATIENT)
Dept: MRI IMAGING | Facility: HOSPITAL | Age: 34
End: 2019-07-13
Attending: EMERGENCY MEDICINE
Payer: MEDICAID

## 2019-07-13 VITALS
WEIGHT: 168 LBS | RESPIRATION RATE: 18 BRPM | OXYGEN SATURATION: 96 % | TEMPERATURE: 98 F | BODY MASS INDEX: 27 KG/M2 | DIASTOLIC BLOOD PRESSURE: 60 MMHG | SYSTOLIC BLOOD PRESSURE: 96 MMHG | HEART RATE: 68 BPM

## 2019-07-13 DIAGNOSIS — R20.2 PARESTHESIAS: Primary | ICD-10-CM

## 2019-07-13 LAB
ALBUMIN SERPL-MCNC: 3.8 G/DL (ref 3.4–5)
ALBUMIN/GLOB SERPL: 1 {RATIO} (ref 1–2)
ALP LIVER SERPL-CCNC: 98 U/L (ref 37–98)
ALT SERPL-CCNC: 33 U/L (ref 13–56)
ANION GAP SERPL CALC-SCNC: 5 MMOL/L (ref 0–18)
AST SERPL-CCNC: 16 U/L (ref 15–37)
BASOPHILS # BLD AUTO: 0.07 X10(3) UL (ref 0–0.2)
BASOPHILS NFR BLD AUTO: 0.8 %
BILIRUB SERPL-MCNC: 0.3 MG/DL (ref 0.1–2)
BUN BLD-MCNC: 14 MG/DL (ref 7–18)
BUN/CREAT SERPL: 22.2 (ref 10–20)
CALCIUM BLD-MCNC: 9.7 MG/DL (ref 8.5–10.1)
CHLORIDE SERPL-SCNC: 110 MMOL/L (ref 98–112)
CO2 SERPL-SCNC: 26 MMOL/L (ref 21–32)
CREAT BLD-MCNC: 0.63 MG/DL (ref 0.55–1.02)
DEPRECATED RDW RBC AUTO: 42.5 FL (ref 35.1–46.3)
EOSINOPHIL # BLD AUTO: 0.13 X10(3) UL (ref 0–0.7)
EOSINOPHIL NFR BLD AUTO: 1.6 %
ERYTHROCYTE [DISTWIDTH] IN BLOOD BY AUTOMATED COUNT: 13 % (ref 11–15)
GLOBULIN PLAS-MCNC: 3.8 G/DL (ref 2.8–4.4)
GLUCOSE BLD-MCNC: 100 MG/DL (ref 70–99)
HCT VFR BLD AUTO: 43.9 % (ref 35–48)
HGB BLD-MCNC: 14.8 G/DL (ref 12–16)
IMM GRANULOCYTES # BLD AUTO: 0.03 X10(3) UL (ref 0–1)
IMM GRANULOCYTES NFR BLD: 0.4 %
LYMPHOCYTES # BLD AUTO: 2.06 X10(3) UL (ref 1–4)
LYMPHOCYTES NFR BLD AUTO: 24.7 %
M PROTEIN MFR SERPL ELPH: 7.6 G/DL (ref 6.4–8.2)
MCH RBC QN AUTO: 30.6 PG (ref 26–34)
MCHC RBC AUTO-ENTMCNC: 33.7 G/DL (ref 31–37)
MCV RBC AUTO: 90.7 FL (ref 80–100)
MONOCYTES # BLD AUTO: 0.49 X10(3) UL (ref 0.1–1)
MONOCYTES NFR BLD AUTO: 5.9 %
NEUTROPHILS # BLD AUTO: 5.56 X10 (3) UL (ref 1.5–7.7)
NEUTROPHILS # BLD AUTO: 5.56 X10(3) UL (ref 1.5–7.7)
NEUTROPHILS NFR BLD AUTO: 66.6 %
OSMOLALITY SERPL CALC.SUM OF ELEC: 293 MOSM/KG (ref 275–295)
PLATELET # BLD AUTO: 346 10(3)UL (ref 150–450)
POCT LOT NUMBER: NORMAL
POCT URINE PREGNANCY: NEGATIVE
POTASSIUM SERPL-SCNC: 4.5 MMOL/L (ref 3.5–5.1)
RBC # BLD AUTO: 4.84 X10(6)UL (ref 3.8–5.3)
SODIUM SERPL-SCNC: 141 MMOL/L (ref 136–145)
WBC # BLD AUTO: 8.3 X10(3) UL (ref 4–11)

## 2019-07-13 PROCEDURE — 96374 THER/PROPH/DIAG INJ IV PUSH: CPT

## 2019-07-13 PROCEDURE — 99284 EMERGENCY DEPT VISIT MOD MDM: CPT

## 2019-07-13 PROCEDURE — 81025 URINE PREGNANCY TEST: CPT

## 2019-07-13 PROCEDURE — 70551 MRI BRAIN STEM W/O DYE: CPT | Performed by: EMERGENCY MEDICINE

## 2019-07-13 PROCEDURE — 85025 COMPLETE CBC W/AUTO DIFF WBC: CPT | Performed by: EMERGENCY MEDICINE

## 2019-07-13 PROCEDURE — 80053 COMPREHEN METABOLIC PANEL: CPT | Performed by: EMERGENCY MEDICINE

## 2019-07-13 RX ORDER — LORAZEPAM 2 MG/ML
INJECTION INTRAMUSCULAR
Status: COMPLETED
Start: 2019-07-13 | End: 2019-07-13

## 2019-07-13 RX ORDER — LORAZEPAM 2 MG/ML
1 INJECTION INTRAMUSCULAR ONCE
Status: COMPLETED | OUTPATIENT
Start: 2019-07-13 | End: 2019-07-13

## 2019-07-13 NOTE — ED PROVIDER NOTES
Patient Seen in: [facility] Emergency Department    History   Patient presents with:  Numbness Weakness (neurologic)    Stated Complaint: left sided numbness for the past two days.      HPI    46-year-old female presents reporting numbness to the lef Current:BP 96/60   Pulse 68   Temp 98.3 °F (36.8 °C) (Temporal)   Resp 18   Wt 76.2 kg   SpO2 96%   BMI 27.12 kg/m²         Physical Exam    General:  Vitals as listed. Appears anxious  HEENT: Sclerae anicteric. Conjunctivae show no pallor.   Omid Chandler the ACR (American College of Radiology) NRDR ([address]) which includes the Dose Index Registry. PATIENT STATED HISTORY: (As transcribed by Technologist)  Patient complains of right facial numbness and a headache since last night. collections. There is no midline shift. SINUSES/ORBITS:       The visualized paranasal sinuses are clear. The orbits are unremarkable. MASTOIDS:      The mastoids are clear. CONCLUSION:  1. No acute infarct.    Dictated by: Ginna Law MD on 7/13/2019 concerns.               Disposition and Plan     Clinical Impression:  Paresthesias  (primary encounter diagnosis)    Disposition:  Discharge  7/13/2019  2:35 pm    Follow-up:  [address] [facility], [facility], [facility] [address]  [facility]

## 2019-07-13 NOTE — ED INITIAL ASSESSMENT (HPI)
Pt here with c/o numbness to left arm, left leg, X 6 days. States she had similar episode a few days ago but to the right side, states she did go to [facility] ER and had negative CT brain.  Patient states the numbness has worsened and she is now having dif

## 2019-07-17 ENCOUNTER — APPOINTMENT (OUTPATIENT)
Dept: INTERNAL MEDICINE | Age: 34
End: 2019-07-17

## 2019-07-23 ENCOUNTER — OFFICE VISIT (OUTPATIENT)
Dept: NEUROLOGY | Facility: CLINIC | Age: 34
End: 2019-07-23
Payer: MEDICAID

## 2019-07-23 VITALS
WEIGHT: 176 LBS | DIASTOLIC BLOOD PRESSURE: 70 MMHG | BODY MASS INDEX: 26 KG/M2 | SYSTOLIC BLOOD PRESSURE: 122 MMHG | HEART RATE: 68 BPM | RESPIRATION RATE: 16 BRPM

## 2019-07-23 DIAGNOSIS — R20.0 NUMBNESS AND TINGLING: ICD-10-CM

## 2019-07-23 DIAGNOSIS — G43.109 MIGRAINE WITH AURA AND WITHOUT STATUS MIGRAINOSUS, NOT INTRACTABLE: ICD-10-CM

## 2019-07-23 DIAGNOSIS — M79.602 LEFT ARM PAIN: ICD-10-CM

## 2019-07-23 DIAGNOSIS — R20.2 NUMBNESS AND TINGLING: ICD-10-CM

## 2019-07-23 PROCEDURE — 1111F DSCHRG MED/CURRENT MED MERGE: CPT | Performed by: OTHER

## 2019-07-23 PROCEDURE — 99204 OFFICE O/P NEW MOD 45 MIN: CPT | Performed by: OTHER

## 2019-07-23 NOTE — PROGRESS NOTES
HPI:    Patient ID: Chris Muñiz is a 36year old female. PCP: Dr Kyra Bender is a 36year old female who presented for evaluation of episodes of dizziness and left sided paresthesia.  She states had 2 episodes this year- with the first episode negative. No current outpatient medications on file. Allergies:  Radiology Contrast *    OTHER (SEE COMMENTS)    Comment:syncope   PHYSICAL EXAM:   Physical Exam   Blood pressure 122/70, pulse 68, resp.  rate 16, weight 176 lb, unknown if current would do additional work up and consider migraine preventive agents    Left upper extremity pain- possible cervical radiculopathy vs carpal tunnel  Obtain MRI cervical spine.  Take NSAID as needed for pain      Thank you for allowing us to participate in yo

## 2019-07-23 NOTE — PROGRESS NOTES
The patient was having numbness on the right side of the face and then the left side spots of numbness. The patient states she was having dizziness. The patient states all side effects have stopped about a week ago.  The patient denies any recent falls, spe

## 2019-07-26 ENCOUNTER — TELEPHONE (OUTPATIENT)
Dept: SURGERY | Facility: CLINIC | Age: 34
End: 2019-07-26

## 2019-07-26 NOTE — TELEPHONE ENCOUNTER
Per note below, MRI of C Spine denied.       MRI might be supported in the evaluation of suspected or known spinal disease with one of the following. -Failure to improve after a recent (within three months) six week trial of physician-guided clinical care w

## 2019-07-26 NOTE — TELEPHONE ENCOUNTER
Prior auth for MRI [id number] denied    Based on [employer] [facility] [facility] Guidelines Section: SP 1.1 General Considerations, we are unable to approve this request. MRI might be supported in the evaluation of suspected or known spinal disease with one of the follow healthcare   Attn: Appealstiven Feliz Do [facility], [address]   Fax: [phone]   The written appeal should include the following information:   - Your name.   - Your member number.   - A phone number where we can reach you.    - Why you t

## 2019-07-30 NOTE — TELEPHONE ENCOUNTER
Called patient - NO answer. Patient will need to be informed that the MRI Cspine w/o contrast was denied. Per notes below from denial, test can be reordered in 3 months if no improvement.     Per Dr. Essence Corcoran:    Deonder Novak MD  [place], [place]

## 2019-07-31 NOTE — TELEPHONE ENCOUNTER
Called patient and informed her that MRI c/s was denied at this time. Informed patient that per denial MRI can be reordered after three months if patient fails to improve. Patient scheduled for 10/15/2019 0940 appointment for f/u.     Encouraged patient

## 2019-08-02 ENCOUNTER — OFFICE VISIT (OUTPATIENT)
Dept: INTERNAL MEDICINE CLINIC | Facility: CLINIC | Age: 34
End: 2019-08-02
Payer: MEDICAID

## 2019-08-02 VITALS
HEART RATE: 88 BPM | BODY MASS INDEX: 27.26 KG/M2 | HEIGHT: 66 IN | SYSTOLIC BLOOD PRESSURE: 114 MMHG | DIASTOLIC BLOOD PRESSURE: 62 MMHG | OXYGEN SATURATION: 96 % | TEMPERATURE: 99 F | RESPIRATION RATE: 14 BRPM | WEIGHT: 169.63 LBS

## 2019-08-02 DIAGNOSIS — M79.602 LEFT ARM PAIN: Primary | ICD-10-CM

## 2019-08-02 DIAGNOSIS — L30.9 HAND ECZEMA: ICD-10-CM

## 2019-08-02 DIAGNOSIS — Z13.89 SCREENING FOR GENITOURINARY CONDITION: ICD-10-CM

## 2019-08-02 DIAGNOSIS — E55.9 VITAMIN D DEFICIENCY: ICD-10-CM

## 2019-08-02 DIAGNOSIS — Z13.1 SCREENING FOR DIABETES MELLITUS: ICD-10-CM

## 2019-08-02 DIAGNOSIS — Z13.228 SCREENING FOR METABOLIC DISORDER: ICD-10-CM

## 2019-08-02 DIAGNOSIS — Z13.220 SCREENING FOR LIPOID DISORDERS: ICD-10-CM

## 2019-08-02 DIAGNOSIS — Z13.0 SCREENING FOR IRON DEFICIENCY ANEMIA: ICD-10-CM

## 2019-08-02 DIAGNOSIS — M54.2 CERVICALGIA: ICD-10-CM

## 2019-08-02 DIAGNOSIS — Z13.29 SCREENING FOR THYROID DISORDER: ICD-10-CM

## 2019-08-02 PROCEDURE — 99204 OFFICE O/P NEW MOD 45 MIN: CPT | Performed by: STUDENT IN AN ORGANIZED HEALTH CARE EDUCATION/TRAINING PROGRAM

## 2019-08-02 RX ORDER — BETAMETHASONE DIPROPIONATE 0.5 MG/G
1 CREAM TOPICAL 2 TIMES DAILY
Qty: 45 G | Refills: 0 | Status: SHIPPED | OUTPATIENT
Start: 2019-08-02 | End: 2020-01-30 | Stop reason: ALTCHOICE

## 2019-08-02 NOTE — PROGRESS NOTES
[facility]    REASON FOR VISIT:    Current Complaints: Patient presents with:  ER F/U: numbness in face and in left arm and leg, does not have the numbness, always fatigue  Hand Pain: little \"bumps\" all over her hands and they are itchy x 1 wee Medications:  betamethasone dipropionate 0.05 % External Cream Apply 1 g topically 2 (two) times daily. Disp: 45 g Rfl: 0        REVIEW OF SYSTEMS:   Constitutional: Negative for fever, chills and fatigue. Neurological: As per HPI.   HENT: Negative for he tenderness. Musculoskeletal: Normal range of motion. No edema. Neurological: Alert and oriented to person, place, and time. Speech is fluent with intact comprehension, repetition and naming. Normal attention and memory. Higher cortical function intact. Cream; Apply 1 g topically 2 (two) times daily. Screening for diabetes mellitus  -     HEMOGLOBIN A1C; Future    Screening for iron deficiency anemia  -     CBC WITH DIFFERENTIAL WITH PLATELET;  Future    Screening for thyroid disorder  -     TSH W REFLE

## 2019-08-04 PROBLEM — R07.9 CHEST PAIN, RULE OUT ACUTE MYOCARDIAL INFARCTION: Status: RESOLVED | Noted: 2017-09-13 | Resolved: 2019-08-04

## 2019-09-29 NOTE — PAYOR COMM NOTE
--------------  ADMISSION REVIEW     Payor: Clark Kuhn #:  [id number]  Authorization Number: N/A    Admit date: N/A  Admit time: N/A       Admitting Physician: Tanner Slater MD  Attending Physician:  Javier Hatch (FEU)    3rd Trimester:  0.13-1.70 ug/mL (FEU)    In pregnant females, refer to the chart above.   No studies have been performed  on pregnant females exclusively to validate the 95% negative predictive value  for venous thromboembolism when the D-Dimer is tromethamine (TORADOL) 30 MG/ML injection 30 mg     Date Action Dose Route User    9/13/2017 2132 Given 30 mg Intravenous Leslee Barajas RN      sodium chloride 0.9% IV bolus 1,000 mL     Date Action Dose Route User    9/13/2017 2131 New Bag 1000 mL Intra Clothing

## 2019-10-09 ENCOUNTER — WALK IN (OUTPATIENT)
Dept: URGENT CARE | Age: 34
End: 2019-10-09

## 2019-10-09 VITALS
HEART RATE: 69 BPM | TEMPERATURE: 98.2 F | RESPIRATION RATE: 18 BRPM | BODY MASS INDEX: 27.32 KG/M2 | WEIGHT: 170 LBS | HEIGHT: 66 IN | DIASTOLIC BLOOD PRESSURE: 60 MMHG | SYSTOLIC BLOOD PRESSURE: 100 MMHG

## 2019-10-09 DIAGNOSIS — B00.1 COLD SORE: Primary | ICD-10-CM

## 2019-10-09 PROCEDURE — 99203 OFFICE O/P NEW LOW 30 MIN: CPT | Performed by: NURSE PRACTITIONER

## 2019-10-09 RX ORDER — VALACYCLOVIR HYDROCHLORIDE 1 G/1
2000 TABLET, FILM COATED ORAL 2 TIMES DAILY
Qty: 4 TABLET | Refills: 0 | Status: SHIPPED | OUTPATIENT
Start: 2019-10-09 | End: 2019-10-10

## 2019-12-05 ENCOUNTER — WALK IN (OUTPATIENT)
Dept: URGENT CARE | Age: 34
End: 2019-12-05

## 2019-12-05 VITALS
HEART RATE: 84 BPM | RESPIRATION RATE: 18 BRPM | DIASTOLIC BLOOD PRESSURE: 76 MMHG | OXYGEN SATURATION: 99 % | BODY MASS INDEX: 27.32 KG/M2 | SYSTOLIC BLOOD PRESSURE: 114 MMHG | HEIGHT: 66 IN | TEMPERATURE: 98.4 F | WEIGHT: 170 LBS

## 2019-12-05 DIAGNOSIS — L02.01 CUTANEOUS ABSCESS OF FACE: ICD-10-CM

## 2019-12-05 DIAGNOSIS — J34.89 INFECTION OF NOSE: Primary | ICD-10-CM

## 2019-12-05 PROCEDURE — 99214 OFFICE O/P EST MOD 30 MIN: CPT | Performed by: NURSE PRACTITIONER

## 2019-12-05 RX ORDER — SULFAMETHOXAZOLE AND TRIMETHOPRIM 800; 160 MG/1; MG/1
1 TABLET ORAL 2 TIMES DAILY
Qty: 20 TABLET | Refills: 0 | Status: SHIPPED | OUTPATIENT
Start: 2019-12-05 | End: 2019-12-15

## 2019-12-05 ASSESSMENT — ENCOUNTER SYMPTOMS
FEVER: 0
SINUS PAIN: 1
FACIAL SWELLING: 1
GASTROINTESTINAL NEGATIVE: 1
EYES NEGATIVE: 1
PSYCHIATRIC NEGATIVE: 1
SINUS PRESSURE: 1
CHILLS: 0
CONSTITUTIONAL NEGATIVE: 1
HEADACHES: 1

## 2020-01-30 ENCOUNTER — OFFICE VISIT (OUTPATIENT)
Dept: FAMILY MEDICINE CLINIC | Facility: CLINIC | Age: 35
End: 2020-01-30
Payer: MEDICAID

## 2020-01-30 VITALS
SYSTOLIC BLOOD PRESSURE: 120 MMHG | DIASTOLIC BLOOD PRESSURE: 70 MMHG | BODY MASS INDEX: 25.91 KG/M2 | WEIGHT: 171 LBS | RESPIRATION RATE: 16 BRPM | OXYGEN SATURATION: 98 % | TEMPERATURE: 98 F | HEIGHT: 68 IN | HEART RATE: 89 BPM

## 2020-01-30 DIAGNOSIS — Z00.00 LABORATORY EXAMINATION ORDERED AS PART OF A ROUTINE GENERAL MEDICAL EXAMINATION: ICD-10-CM

## 2020-01-30 DIAGNOSIS — N63.10 LUMP OF RIGHT BREAST: ICD-10-CM

## 2020-01-30 DIAGNOSIS — N64.4 BREAST PAIN IN FEMALE: ICD-10-CM

## 2020-01-30 DIAGNOSIS — Z01.419 WELL FEMALE EXAM WITH ROUTINE GYNECOLOGICAL EXAM: Primary | ICD-10-CM

## 2020-01-30 DIAGNOSIS — J02.9 SORE THROAT: ICD-10-CM

## 2020-01-30 DIAGNOSIS — Z20.818 STREP THROAT EXPOSURE: ICD-10-CM

## 2020-01-30 DIAGNOSIS — Z12.4 ROUTINE CERVICAL SMEAR: ICD-10-CM

## 2020-01-30 LAB
CONTROL LINE PRESENT WITH A CLEAR BACKGROUND (YES/NO): YES YES/NO
KIT LOT #: NORMAL NUMERIC

## 2020-01-30 PROCEDURE — 88175 CYTOPATH C/V AUTO FLUID REDO: CPT | Performed by: FAMILY MEDICINE

## 2020-01-30 PROCEDURE — 87624 HPV HI-RISK TYP POOLED RSLT: CPT | Performed by: FAMILY MEDICINE

## 2020-01-30 PROCEDURE — 87880 STREP A ASSAY W/OPTIC: CPT | Performed by: FAMILY MEDICINE

## 2020-01-30 PROCEDURE — 99385 PREV VISIT NEW AGE 18-39: CPT | Performed by: FAMILY MEDICINE

## 2020-01-30 PROCEDURE — 87081 CULTURE SCREEN ONLY: CPT | Performed by: FAMILY MEDICINE

## 2020-01-30 NOTE — PROGRESS NOTES
Joselyn Soliman is a 36year old female.     CC:  Patient presents with:  Establish Care  Well Adult: annual visit, no pap smear   Breast Pain: right side      HPI:  Annual Physical due on 03/13/1989  Influenza Vaccine(1) due on 01/30/2021  Annual Depression medications on file prior to visit.         History:  Past Medical History:   Diagnosis Date   • Chronic cystitis    • Chronic pyelonephritis    • Gestational diabetes    • Problems with swallowing    • Shortness of breath       Past Surgical History:   Pro depression or anxiety, depression screening negative. EXAM:   /70   Pulse 89   Temp 98.3 °F (36.8 °C) (Oral)   Resp 16   Ht 68\"   Wt 171 lb (77.6 kg)   SpO2 98%   BMI 26.00 kg/m²   Body mass index is 26 kg/m². No LMP recorded.  (Menstrual statu Consults:  COREEN MCKAY 2D+3D DIAGNOSTIC COREEN WILKINSON (CPT=77066/12248)    Return in about 1 year (around 1/30/2021) for annual physical.

## 2020-01-30 NOTE — PATIENT INSTRUCTIONS
Health Maintenance  Return to office for fasting blood work  Eat healthy well balanced diet - majority should be protein and vegetables  Get at least 150 min of exercise per week (30 min/5 days)  Wear sunscreen - SPF 30 or higher and reapply every 2 hours.

## 2020-01-31 ENCOUNTER — LAB ENCOUNTER (OUTPATIENT)
Dept: LAB | Age: 35
End: 2020-01-31
Attending: FAMILY MEDICINE
Payer: MEDICAID

## 2020-01-31 DIAGNOSIS — Z00.00 LABORATORY EXAMINATION ORDERED AS PART OF A ROUTINE GENERAL MEDICAL EXAMINATION: ICD-10-CM

## 2020-01-31 LAB
ALBUMIN SERPL-MCNC: 3.8 G/DL (ref 3.4–5)
ALBUMIN/GLOB SERPL: 1.1 {RATIO} (ref 1–2)
ALP LIVER SERPL-CCNC: 61 U/L (ref 37–98)
ALT SERPL-CCNC: 38 U/L (ref 13–56)
ANION GAP SERPL CALC-SCNC: 4 MMOL/L (ref 0–18)
AST SERPL-CCNC: 20 U/L (ref 15–37)
BASOPHILS # BLD AUTO: 0.03 X10(3) UL (ref 0–0.2)
BASOPHILS NFR BLD AUTO: 0.6 %
BILIRUB SERPL-MCNC: 0.6 MG/DL (ref 0.1–2)
BUN BLD-MCNC: 15 MG/DL (ref 7–18)
BUN/CREAT SERPL: 21.4 (ref 10–20)
CALCIUM BLD-MCNC: 8.8 MG/DL (ref 8.5–10.1)
CHLORIDE SERPL-SCNC: 107 MMOL/L (ref 98–112)
CHOLEST SMN-MCNC: 215 MG/DL (ref ?–200)
CO2 SERPL-SCNC: 28 MMOL/L (ref 21–32)
CREAT BLD-MCNC: 0.7 MG/DL (ref 0.55–1.02)
DEPRECATED RDW RBC AUTO: 44.2 FL (ref 35.1–46.3)
EOSINOPHIL # BLD AUTO: 0.11 X10(3) UL (ref 0–0.7)
EOSINOPHIL NFR BLD AUTO: 2.3 %
ERYTHROCYTE [DISTWIDTH] IN BLOOD BY AUTOMATED COUNT: 13.4 % (ref 11–15)
GLOBULIN PLAS-MCNC: 3.6 G/DL (ref 2.8–4.4)
GLUCOSE BLD-MCNC: 99 MG/DL (ref 70–99)
HCT VFR BLD AUTO: 44.3 % (ref 35–48)
HDLC SERPL-MCNC: 47 MG/DL (ref 40–59)
HGB BLD-MCNC: 14.8 G/DL (ref 12–16)
HPV I/H RISK 1 DNA SPEC QL NAA+PROBE: NEGATIVE
IMM GRANULOCYTES # BLD AUTO: 0.01 X10(3) UL (ref 0–1)
IMM GRANULOCYTES NFR BLD: 0.2 %
LDLC SERPL CALC-MCNC: 140 MG/DL (ref ?–100)
LYMPHOCYTES # BLD AUTO: 1.93 X10(3) UL (ref 1–4)
LYMPHOCYTES NFR BLD AUTO: 39.5 %
M PROTEIN MFR SERPL ELPH: 7.4 G/DL (ref 6.4–8.2)
MCH RBC QN AUTO: 30.2 PG (ref 26–34)
MCHC RBC AUTO-ENTMCNC: 33.4 G/DL (ref 31–37)
MCV RBC AUTO: 90.4 FL (ref 80–100)
MONOCYTES # BLD AUTO: 0.33 X10(3) UL (ref 0.1–1)
MONOCYTES NFR BLD AUTO: 6.8 %
NEUTROPHILS # BLD AUTO: 2.47 X10 (3) UL (ref 1.5–7.7)
NEUTROPHILS # BLD AUTO: 2.47 X10(3) UL (ref 1.5–7.7)
NEUTROPHILS NFR BLD AUTO: 50.6 %
NONHDLC SERPL-MCNC: 168 MG/DL (ref ?–130)
OSMOLALITY SERPL CALC.SUM OF ELEC: 289 MOSM/KG (ref 275–295)
PATIENT FASTING Y/N/NP: YES
PATIENT FASTING Y/N/NP: YES
PLATELET # BLD AUTO: 253 10(3)UL (ref 150–450)
POTASSIUM SERPL-SCNC: 4 MMOL/L (ref 3.5–5.1)
RBC # BLD AUTO: 4.9 X10(6)UL (ref 3.8–5.3)
SODIUM SERPL-SCNC: 139 MMOL/L (ref 136–145)
TRIGL SERPL-MCNC: 140 MG/DL (ref 30–149)
TSI SER-ACNC: 0.37 MIU/ML (ref 0.36–3.74)
VLDLC SERPL CALC-MCNC: 28 MG/DL (ref 0–30)
WBC # BLD AUTO: 4.9 X10(3) UL (ref 4–11)

## 2020-01-31 PROCEDURE — 36415 COLL VENOUS BLD VENIPUNCTURE: CPT

## 2020-01-31 PROCEDURE — 85025 COMPLETE CBC W/AUTO DIFF WBC: CPT

## 2020-01-31 PROCEDURE — 80053 COMPREHEN METABOLIC PANEL: CPT

## 2020-01-31 PROCEDURE — 84443 ASSAY THYROID STIM HORMONE: CPT

## 2020-01-31 PROCEDURE — 80061 LIPID PANEL: CPT

## 2020-02-05 ENCOUNTER — TELEPHONE (OUTPATIENT)
Dept: FAMILY MEDICINE CLINIC | Facility: CLINIC | Age: 35
End: 2020-02-05

## 2020-02-05 NOTE — TELEPHONE ENCOUNTER
LOV for rheumatoid arthritis was on 10/18/17, discussed some abnl labs from 10/17/17 at [facility].  Patient had repeat labs on 11/10/17, results final in Epic.    Please advise.   Patient called she said she would like someone to call her back about her test results from 1/30/2020.

## 2020-02-05 NOTE — TELEPHONE ENCOUNTER
Called patient and spoke with her. Patient wanted to verify that her pap smear was normal.  Went over recent pap smear results with patient. Patient states understanding.

## 2020-02-10 ENCOUNTER — HOSPITAL ENCOUNTER (OUTPATIENT)
Dept: ULTRASOUND IMAGING | Age: 35
Discharge: HOME | End: 2020-02-10
Attending: FAMILY MEDICINE
Payer: MEDICAID

## 2020-02-10 ENCOUNTER — HOSPITAL ENCOUNTER (OUTPATIENT)
Dept: MAMMOGRAPHY | Age: 35
Discharge: HOME | End: 2020-02-10
Attending: FAMILY MEDICINE
Payer: MEDICAID

## 2020-02-10 DIAGNOSIS — N64.4 BREAST PAIN IN FEMALE: ICD-10-CM

## 2020-02-10 DIAGNOSIS — N63.10 LUMP OF RIGHT BREAST: ICD-10-CM

## 2020-02-10 PROCEDURE — 77062 BREAST TOMOSYNTHESIS BI: CPT | Performed by: FAMILY MEDICINE

## 2020-02-10 PROCEDURE — 77066 DX MAMMO INCL CAD BI: CPT | Performed by: FAMILY MEDICINE

## 2020-02-10 PROCEDURE — 76642 ULTRASOUND BREAST LIMITED: CPT | Performed by: FAMILY MEDICINE

## 2020-03-31 ENCOUNTER — OFFICE VISIT (OUTPATIENT)
Dept: FAMILY MEDICINE CLINIC | Facility: CLINIC | Age: 35
End: 2020-03-31
Payer: MEDICAID

## 2020-03-31 ENCOUNTER — TELEPHONE (OUTPATIENT)
Dept: FAMILY MEDICINE CLINIC | Facility: CLINIC | Age: 35
End: 2020-03-31

## 2020-03-31 DIAGNOSIS — Z02.9 ADMINISTRATIVE ENCOUNTER: Primary | ICD-10-CM

## 2020-03-31 DIAGNOSIS — H60.311 ACUTE DIFFUSE OTITIS EXTERNA OF RIGHT EAR: Primary | ICD-10-CM

## 2020-03-31 PROCEDURE — 99213 OFFICE O/P EST LOW 20 MIN: CPT | Performed by: NURSE PRACTITIONER

## 2020-03-31 NOTE — PROGRESS NOTES
Due to URI symptoms during COVID-19 outbreak, patient was evaluated by phone. Please see telephone encounter for further details.

## 2020-03-31 NOTE — TELEPHONE ENCOUNTER
Virtual/Telephone Check-In    Crispin Doherty verbally consents to a Virtual/Telephone Check-In service on 03/31/20. Patient understands and accepts financial responsibility for any deductible, co-insurance and/or co-pays associated with this service.     Dura

## 2020-11-29 ENCOUNTER — HOSPITAL ENCOUNTER (EMERGENCY)
Age: 35
Discharge: HOME | End: 2020-11-29
Payer: MEDICAID

## 2020-11-29 VITALS
TEMPERATURE: 98 F | HEIGHT: 66 IN | DIASTOLIC BLOOD PRESSURE: 71 MMHG | HEART RATE: 65 BPM | SYSTOLIC BLOOD PRESSURE: 108 MMHG | WEIGHT: 175 LBS | RESPIRATION RATE: 18 BRPM | OXYGEN SATURATION: 96 % | BODY MASS INDEX: 28.13 KG/M2

## 2020-11-29 DIAGNOSIS — M79.10 MYALGIA: Primary | ICD-10-CM

## 2020-11-29 PROCEDURE — 99284 EMERGENCY DEPT VISIT MOD MDM: CPT

## 2020-11-29 PROCEDURE — 85025 COMPLETE CBC W/AUTO DIFF WBC: CPT | Performed by: PHYSICIAN ASSISTANT

## 2020-11-29 PROCEDURE — 96360 HYDRATION IV INFUSION INIT: CPT

## 2020-11-29 PROCEDURE — 80053 COMPREHEN METABOLIC PANEL: CPT | Performed by: PHYSICIAN ASSISTANT

## 2020-11-29 RX ORDER — IBUPROFEN 600 MG/1
600 TABLET ORAL EVERY 8 HOURS PRN
Qty: 30 TABLET | Refills: 0 | Status: SHIPPED | OUTPATIENT
Start: 2020-11-29 | End: 2020-12-06

## 2020-11-29 NOTE — ED INITIAL ASSESSMENT (HPI)
Pt c/o bilateral arm pain that started 2 hours ago. Pt denies injury.  Pt also believes her veins are getting larger in her arms today, no visual findings of vein growth noted

## 2020-11-29 NOTE — ED PROVIDER NOTES
Patient Seen in: [facility] Emergency Department In [place]      History   Patient presents with:  Pain    Stated Complaint: reports bilateral elbow pain and \"enlarged veins\" in arms    45-year-old female without significant past medical history present toxic-appearing or diaphoretic. Eyes:      Conjunctiva/sclera: Conjunctivae normal.      Pupils: Pupils are equal, round, and reactive to light. Neck:      Musculoskeletal: Normal range of motion and neck supple.  No neck rigidity or muscular tenderness 11/29/2020    K 4.0 11/29/2020     11/29/2020    CO2 27.0 11/29/2020    GLU 94 11/29/2020    CA 8.6 11/29/2020    ALB 3.4 11/29/2020    ALKPHO 96 11/29/2020    BILT 0.4 11/29/2020    TP 7.4 11/29/2020    AST 60 11/29/2020     11/29/2020

## 2020-12-31 ENCOUNTER — HOSPITAL ENCOUNTER (EMERGENCY)
Age: 35
Discharge: HOME | End: 2020-12-31
Attending: EMERGENCY MEDICINE
Payer: MEDICAID

## 2020-12-31 ENCOUNTER — TELEPHONE (OUTPATIENT)
Dept: SCHEDULING | Age: 35
End: 2020-12-31

## 2020-12-31 VITALS
OXYGEN SATURATION: 98 % | HEART RATE: 72 BPM | WEIGHT: 176.38 LBS | DIASTOLIC BLOOD PRESSURE: 68 MMHG | SYSTOLIC BLOOD PRESSURE: 108 MMHG | RESPIRATION RATE: 16 BRPM | TEMPERATURE: 99 F | BODY MASS INDEX: 28 KG/M2

## 2020-12-31 DIAGNOSIS — J34.0 CELLULITIS OF NASAL TIP: Primary | ICD-10-CM

## 2020-12-31 PROCEDURE — 87081 CULTURE SCREEN ONLY: CPT | Performed by: EMERGENCY MEDICINE

## 2020-12-31 PROCEDURE — 99284 EMERGENCY DEPT VISIT MOD MDM: CPT

## 2020-12-31 PROCEDURE — S0077 INJECTION, CLINDAMYCIN PHOSP: HCPCS | Performed by: EMERGENCY MEDICINE

## 2020-12-31 PROCEDURE — 96365 THER/PROPH/DIAG IV INF INIT: CPT

## 2020-12-31 RX ORDER — CLINDAMYCIN HYDROCHLORIDE 300 MG/1
300 CAPSULE ORAL 3 TIMES DAILY
Qty: 30 CAPSULE | Refills: 0 | Status: SHIPPED | OUTPATIENT
Start: 2020-12-31 | End: 2021-01-10

## 2020-12-31 RX ORDER — IBUPROFEN 600 MG/1
600 TABLET ORAL ONCE
Status: COMPLETED | OUTPATIENT
Start: 2020-12-31 | End: 2020-12-31

## 2020-12-31 RX ORDER — CLINDAMYCIN PHOSPHATE 600 MG/50ML
600 INJECTION INTRAVENOUS ONCE
Status: COMPLETED | OUTPATIENT
Start: 2020-12-31 | End: 2020-12-31

## 2020-12-31 NOTE — ED PROVIDER NOTES
Patient Seen in: THE [facility] In [place]      History   Patient presents with:  Cellulitis    Stated Complaint: swelling to nose for 2 days    HPI    This is a 32-year-old female presents with nasal sore and nasal cellulitis.   Patient r Oropharynx is clear and moist.  Left nare is erythematous. No obvious lesion. Swelling around right nare with crusty lesions on tip of nose. Lungs: Clear to auscultation bilaterally with no rales, no retractions, and no wheezing.   HEART:  Regular rate

## 2021-05-09 ENCOUNTER — OFFICE VISIT (OUTPATIENT)
Dept: FAMILY MEDICINE CLINIC | Facility: CLINIC | Age: 36
End: 2021-05-09
Payer: COMMERCIAL

## 2021-05-09 VITALS
RESPIRATION RATE: 18 BRPM | DIASTOLIC BLOOD PRESSURE: 80 MMHG | SYSTOLIC BLOOD PRESSURE: 122 MMHG | OXYGEN SATURATION: 99 % | WEIGHT: 175 LBS | HEART RATE: 69 BPM | BODY MASS INDEX: 28 KG/M2 | TEMPERATURE: 98 F

## 2021-05-09 DIAGNOSIS — L01.00 IMPETIGO: Primary | ICD-10-CM

## 2021-05-09 PROCEDURE — 99213 OFFICE O/P EST LOW 20 MIN: CPT | Performed by: NURSE PRACTITIONER

## 2021-05-09 PROCEDURE — 3074F SYST BP LT 130 MM HG: CPT | Performed by: NURSE PRACTITIONER

## 2021-05-09 PROCEDURE — 3079F DIAST BP 80-89 MM HG: CPT | Performed by: NURSE PRACTITIONER

## 2021-05-09 RX ORDER — CLINDAMYCIN HYDROCHLORIDE 300 MG/1
300 CAPSULE ORAL 3 TIMES DAILY
Qty: 30 CAPSULE | Refills: 0 | Status: SHIPPED | OUTPATIENT
Start: 2021-05-09 | End: 2021-05-19

## 2021-05-09 NOTE — PROGRESS NOTES
CHIEF COMPLAINT:   Patient presents with:  Skin: crusty lesions, swollen lips X 1 day      HPI:     Fabiola Posey is a 29year old female who presents with concerns of swollen lips with crusty lesions. Patient states symptoms present 1  days.   Reports purnima 98.3 °F (36.8 °C) (Other)   Resp 18   Wt 175 lb (79.4 kg)   SpO2 99%   BMI 28.25 kg/m²   GENERAL: well developed, well nourished,in no apparent distress  SKIN: lips swollen with vesicular lesions and honey colored crusting.   No surrounding erythema or faci causing impetigo are Staphylococcus and Streptococcus. In certain cases, impetigo appears on skin that has no visible break. It may be more likely to occur on skin that has another skin problem, such as eczema.  It may also be more common after a cold or ot or spreading areas of redness after 2 days of treatment with antibiotics  · Increasing swelling or pain  · Increased amounts of fluid or pus coming from the sores  · Unusual drowsiness, weakness, or change in behavior  · Loss of appetite or vomiting  StayW

## 2021-05-09 NOTE — PATIENT INSTRUCTIONS
Understanding Impetigo  Impetigo is a common bacterial infection of the skin. It most often affects the face, arms, and legs. But it can appear on any part of the body. Anyone can have it, regardless of age. But it's most common in children.  Impetigo is your hands often. Avoid sharing personal items, towels, clothes, pillows, and sheets with others. After each use, wash these items in hot water. · Clean the affected skin several times a day. Don’t scrub. Instead, soak the area in warm, soapy water.  This

## 2021-10-13 ENCOUNTER — OFFICE VISIT (OUTPATIENT)
Dept: FAMILY MEDICINE CLINIC | Facility: CLINIC | Age: 36
End: 2021-10-13
Payer: COMMERCIAL

## 2021-10-13 VITALS
OXYGEN SATURATION: 98 % | WEIGHT: 195 LBS | BODY MASS INDEX: 31.34 KG/M2 | RESPIRATION RATE: 16 BRPM | DIASTOLIC BLOOD PRESSURE: 60 MMHG | HEIGHT: 66 IN | HEART RATE: 80 BPM | TEMPERATURE: 98 F | SYSTOLIC BLOOD PRESSURE: 100 MMHG

## 2021-10-13 DIAGNOSIS — L30.9 DERMATITIS: ICD-10-CM

## 2021-10-13 DIAGNOSIS — E66.09 CLASS 1 OBESITY DUE TO EXCESS CALORIES WITHOUT SERIOUS COMORBIDITY WITH BODY MASS INDEX (BMI) OF 31.0 TO 31.9 IN ADULT: ICD-10-CM

## 2021-10-13 DIAGNOSIS — Z00.00 ROUTINE PHYSICAL EXAMINATION: Primary | ICD-10-CM

## 2021-10-13 DIAGNOSIS — Z00.00 LABORATORY EXAMINATION ORDERED AS PART OF A ROUTINE GENERAL MEDICAL EXAMINATION: ICD-10-CM

## 2021-10-13 DIAGNOSIS — Z86.32 HISTORY OF GESTATIONAL DIABETES: ICD-10-CM

## 2021-10-13 PROCEDURE — 3074F SYST BP LT 130 MM HG: CPT | Performed by: FAMILY MEDICINE

## 2021-10-13 PROCEDURE — 99395 PREV VISIT EST AGE 18-39: CPT | Performed by: FAMILY MEDICINE

## 2021-10-13 PROCEDURE — 3008F BODY MASS INDEX DOCD: CPT | Performed by: FAMILY MEDICINE

## 2021-10-13 PROCEDURE — 3078F DIAST BP <80 MM HG: CPT | Performed by: FAMILY MEDICINE

## 2021-10-13 PROCEDURE — 96372 THER/PROPH/DIAG INJ SC/IM: CPT | Performed by: FAMILY MEDICINE

## 2021-10-13 PROCEDURE — 99212 OFFICE O/P EST SF 10 MIN: CPT | Performed by: FAMILY MEDICINE

## 2021-10-13 RX ORDER — DOXYCYCLINE 100 MG/1
TABLET ORAL 2 TIMES DAILY
COMMUNITY
Start: 2021-10-11 | End: 2021-10-19

## 2021-10-13 RX ORDER — METHYLPREDNISOLONE SODIUM SUCCINATE 125 MG/2ML
125 INJECTION, POWDER, LYOPHILIZED, FOR SOLUTION INTRAMUSCULAR; INTRAVENOUS ONCE
Status: COMPLETED | OUTPATIENT
Start: 2021-10-13 | End: 2021-10-13

## 2021-10-13 RX ORDER — CEFDINIR 300 MG/1
CAPSULE ORAL EVERY 12 HOURS
COMMUNITY
End: 2021-10-19

## 2021-10-13 RX ORDER — PREDNISONE 20 MG/1
40 TABLET ORAL DAILY
Qty: 10 TABLET | Refills: 0 | Status: SHIPPED | OUTPATIENT
Start: 2021-10-14 | End: 2021-10-19

## 2021-10-13 RX ADMIN — METHYLPREDNISOLONE SODIUM SUCCINATE 125 MG: 125 INJECTION, POWDER, LYOPHILIZED, FOR SOLUTION INTRAMUSCULAR; INTRAVENOUS at 16:00:00

## 2021-10-13 NOTE — PROGRESS NOTES
Vanessa Ward is a 29year old female. CC:  Patient presents with:   Well Adult: no pap smear   Urgent Care F/u: 10/11, possible insect bite, right arm, swelling, redness, itching      HPI:  COVID-19 Vaccine(1) Never done  Annual Depression Screen due on breath       Past Surgical History:   Procedure Laterality Date   • CHOLECYSTECTOMY  08/22/2018      No family history on file. No family status information on file. Social History    Tobacco Use      Smoking status: Former Smoker        Years: 8. 0 developed, well nourished, in no apparent distress  HEENT: atraumatic, normocephalic,ears and throat are clear  EYES:PERRL, EOMI, conjunctiva are clear  NECK: supple,no adenopathy,no bruits  CHEST: no chest tenderness  BREAST: No masses or lesions, no nipp

## 2021-10-13 NOTE — PATIENT INSTRUCTIONS
Health Maintenance    Return to office (or other lab) for fasting blood work    Health and [address] healthy well balanced diet - majority should be protein and vegetables  Get at least 150 min of exercise per week (30 min/5 days)  Wear sunscreen - SPF

## 2021-10-19 ENCOUNTER — OFFICE VISIT (OUTPATIENT)
Dept: FAMILY MEDICINE CLINIC | Facility: CLINIC | Age: 36
End: 2021-10-19
Payer: COMMERCIAL

## 2021-10-19 ENCOUNTER — LAB ENCOUNTER (OUTPATIENT)
Dept: LAB | Age: 36
End: 2021-10-19
Attending: FAMILY MEDICINE
Payer: COMMERCIAL

## 2021-10-19 VITALS
DIASTOLIC BLOOD PRESSURE: 60 MMHG | SYSTOLIC BLOOD PRESSURE: 100 MMHG | HEART RATE: 72 BPM | HEIGHT: 66 IN | BODY MASS INDEX: 31.34 KG/M2 | WEIGHT: 195 LBS | TEMPERATURE: 98 F | OXYGEN SATURATION: 98 % | RESPIRATION RATE: 16 BRPM

## 2021-10-19 DIAGNOSIS — E66.09 CLASS 1 OBESITY DUE TO EXCESS CALORIES WITHOUT SERIOUS COMORBIDITY WITH BODY MASS INDEX (BMI) OF 31.0 TO 31.9 IN ADULT: ICD-10-CM

## 2021-10-19 DIAGNOSIS — L30.9 DERMATITIS: Primary | ICD-10-CM

## 2021-10-19 DIAGNOSIS — Z86.32 HISTORY OF GESTATIONAL DIABETES: ICD-10-CM

## 2021-10-19 DIAGNOSIS — Z00.00 LABORATORY EXAMINATION ORDERED AS PART OF A ROUTINE GENERAL MEDICAL EXAMINATION: ICD-10-CM

## 2021-10-19 PROCEDURE — 84481 FREE ASSAY (FT-3): CPT | Performed by: FAMILY MEDICINE

## 2021-10-19 PROCEDURE — 3074F SYST BP LT 130 MM HG: CPT | Performed by: FAMILY MEDICINE

## 2021-10-19 PROCEDURE — 80050 GENERAL HEALTH PANEL: CPT | Performed by: FAMILY MEDICINE

## 2021-10-19 PROCEDURE — 3078F DIAST BP <80 MM HG: CPT | Performed by: FAMILY MEDICINE

## 2021-10-19 PROCEDURE — 84439 ASSAY OF FREE THYROXINE: CPT | Performed by: FAMILY MEDICINE

## 2021-10-19 PROCEDURE — 3008F BODY MASS INDEX DOCD: CPT | Performed by: FAMILY MEDICINE

## 2021-10-19 PROCEDURE — 83036 HEMOGLOBIN GLYCOSYLATED A1C: CPT | Performed by: FAMILY MEDICINE

## 2021-10-19 PROCEDURE — 99213 OFFICE O/P EST LOW 20 MIN: CPT | Performed by: FAMILY MEDICINE

## 2021-10-19 PROCEDURE — 80061 LIPID PANEL: CPT | Performed by: FAMILY MEDICINE

## 2021-10-19 RX ORDER — PHENTERMINE HYDROCHLORIDE 37.5 MG/1
37.5 TABLET ORAL
Qty: 30 TABLET | Refills: 2 | Status: SHIPPED | OUTPATIENT
Start: 2021-10-19

## 2021-10-19 NOTE — PROGRESS NOTES
Subjective:   Amanda Monge is a 29year old female who presents for Swelling (f/u arm swelling, decreased, pt stated she stopped medication )     Follow-up dermatitis  Patient states the redness, itching, swelling of the right forearm improved within 12 olu Weight as of this encounter: 195 lb (88.5 kg). Physical Exam  Constitutional:       Appearance: She is well-developed. Cardiovascular:      Rate and Rhythm: Normal rate and regular rhythm. Heart sounds: No murmur heard.       Pulmonary:      Effort:

## 2021-10-25 ENCOUNTER — TELEPHONE (OUTPATIENT)
Dept: FAMILY MEDICINE CLINIC | Facility: CLINIC | Age: 36
End: 2021-10-25

## 2021-10-25 DIAGNOSIS — E78.5 HYPERLIPIDEMIA, UNSPECIFIED HYPERLIPIDEMIA TYPE: Primary | ICD-10-CM

## 2021-10-25 DIAGNOSIS — R74.8 ELEVATED LIVER ENZYMES: ICD-10-CM

## 2021-10-25 NOTE — TELEPHONE ENCOUNTER
----- Message from Imani Butler MD sent at 10/24/2021  7:45 PM CDT -----  Results reviewed. Please inform patient liver enzymes and cholesterol are elevated.  She will work on weight loss with phentermine - needs to follow up in 3 months and repeat lipid/cm

## 2021-12-23 LAB — AMB EXT COVID-19 RESULT: DETECTED

## 2021-12-26 ENCOUNTER — HOSPITAL ENCOUNTER (EMERGENCY)
Age: 36
Discharge: LEFT BEFORE DISCHARGE | End: 2021-12-26
Payer: COMMERCIAL

## 2021-12-27 ENCOUNTER — VIRTUAL PHONE E/M (OUTPATIENT)
Dept: FAMILY MEDICINE CLINIC | Facility: CLINIC | Age: 36
End: 2021-12-27
Payer: COMMERCIAL

## 2021-12-27 DIAGNOSIS — R43.0 LOSS OF SMELL: ICD-10-CM

## 2021-12-27 DIAGNOSIS — R50.9 FEVER, UNSPECIFIED FEVER CAUSE: ICD-10-CM

## 2021-12-27 PROCEDURE — 99212 OFFICE O/P EST SF 10 MIN: CPT | Performed by: FAMILY MEDICINE

## 2021-12-27 RX ORDER — ALBUTEROL SULFATE 90 UG/1
2 AEROSOL, METERED RESPIRATORY (INHALATION) EVERY 4 HOURS PRN
Qty: 18 G | Refills: 0 | Status: SHIPPED | OUTPATIENT
Start: 2021-12-27

## 2021-12-27 NOTE — PROGRESS NOTES
Telephone Check-In    Jayshree Merchant verbally consents to a Virtual/Telephone Check-In service on 12/27/21. Patient understands and accepts financial responsibility for any deductible, co-insurance and/or co-pays associated with this service.     Duration o

## 2021-12-27 NOTE — PATIENT INSTRUCTIONS
Coronavirus Disease 2019 (COVID-19)     [facility] is committed to the safety and well-being of our patients, members, employees, and communities.  As concerns arise about the new strain of coronavirus that causes COVID-19, [employer] your household, like dishes, towels, and bedding   10. Clean all surfaces that are touched often, like counters, tabletops, and doorknobs. Use household cleaning sprays or wipes according to the label instructions.       Patients with confirmed COVID-19 shanelle

## 2022-01-03 ENCOUNTER — OFFICE VISIT (OUTPATIENT)
Dept: FAMILY MEDICINE CLINIC | Facility: CLINIC | Age: 37
End: 2022-01-03
Payer: COMMERCIAL

## 2022-01-03 ENCOUNTER — HOSPITAL ENCOUNTER (OUTPATIENT)
Dept: GENERAL RADIOLOGY | Age: 37
Discharge: HOME | End: 2022-01-03
Attending: FAMILY MEDICINE
Payer: COMMERCIAL

## 2022-01-03 VITALS
HEART RATE: 76 BPM | BODY MASS INDEX: 29.41 KG/M2 | OXYGEN SATURATION: 99 % | SYSTOLIC BLOOD PRESSURE: 100 MMHG | HEIGHT: 66 IN | DIASTOLIC BLOOD PRESSURE: 60 MMHG | WEIGHT: 183 LBS | RESPIRATION RATE: 16 BRPM

## 2022-01-03 DIAGNOSIS — R06.00 DOE (DYSPNEA ON EXERTION): ICD-10-CM

## 2022-01-03 DIAGNOSIS — R07.89 CHEST DISCOMFORT: ICD-10-CM

## 2022-01-03 DIAGNOSIS — U07.1 COVID-19 VIRUS INFECTION: Primary | ICD-10-CM

## 2022-01-03 DIAGNOSIS — U07.1 COVID-19 VIRUS INFECTION: ICD-10-CM

## 2022-01-03 PROCEDURE — 3008F BODY MASS INDEX DOCD: CPT | Performed by: FAMILY MEDICINE

## 2022-01-03 PROCEDURE — 3074F SYST BP LT 130 MM HG: CPT | Performed by: FAMILY MEDICINE

## 2022-01-03 PROCEDURE — 71046 X-RAY EXAM CHEST 2 VIEWS: CPT | Performed by: FAMILY MEDICINE

## 2022-01-03 PROCEDURE — 99214 OFFICE O/P EST MOD 30 MIN: CPT | Performed by: FAMILY MEDICINE

## 2022-01-03 PROCEDURE — 3078F DIAST BP <80 MM HG: CPT | Performed by: FAMILY MEDICINE

## 2022-01-04 NOTE — PROGRESS NOTES
Subjective:   Denisse Isbell is a 29year old female who presents for Follow - Up (covid follow up, c/o upper back pain, fatigue )     covid follow up   Sx started 12/23/21    was positive on 12/22/21   Patient with fewer sx and less severe   Unvaccina decreased air movement. No decreased breath sounds, wheezing or rhonchi. Neurological:      Mental Status: She is alert.    Psychiatric:         Mood and Affect: Mood normal.         Behavior: Behavior normal.           Assessment & Plan:   Shawn ray

## 2022-02-07 ENCOUNTER — OFFICE VISIT (OUTPATIENT)
Dept: FAMILY MEDICINE CLINIC | Facility: CLINIC | Age: 37
End: 2022-02-07
Payer: COMMERCIAL

## 2022-02-07 VITALS
BODY MASS INDEX: 30.86 KG/M2 | HEIGHT: 66 IN | TEMPERATURE: 98 F | RESPIRATION RATE: 16 BRPM | WEIGHT: 192 LBS | DIASTOLIC BLOOD PRESSURE: 60 MMHG | SYSTOLIC BLOOD PRESSURE: 100 MMHG | HEART RATE: 80 BPM | OXYGEN SATURATION: 98 %

## 2022-02-07 DIAGNOSIS — M25.559 ISCHIAL PAIN, UNSPECIFIED LATERALITY: ICD-10-CM

## 2022-02-07 DIAGNOSIS — N75.0 BARTHOLIN GLAND CYST: Primary | ICD-10-CM

## 2022-02-07 DIAGNOSIS — M54.9 BACK PAIN, UNSPECIFIED BACK LOCATION, UNSPECIFIED BACK PAIN LATERALITY, UNSPECIFIED CHRONICITY: ICD-10-CM

## 2022-02-07 LAB
APPEARANCE: CLEAR
BILIRUBIN: NEGATIVE
GLUCOSE (URINE DIPSTICK): NEGATIVE MG/DL
LEUKOCYTES: NEGATIVE
MULTISTIX LOT#: NORMAL NUMERIC
NITRITE, URINE: NEGATIVE
OCCULT BLOOD: NEGATIVE
PH, URINE: 6 (ref 4.5–8)
PROTEIN (URINE DIPSTICK): NEGATIVE MG/DL
URINE-COLOR: YELLOW
UROBILINOGEN,SEMI-QN: 0.2 MG/DL (ref 0–1.9)

## 2022-02-07 PROCEDURE — 3074F SYST BP LT 130 MM HG: CPT | Performed by: FAMILY MEDICINE

## 2022-02-07 PROCEDURE — 99213 OFFICE O/P EST LOW 20 MIN: CPT | Performed by: FAMILY MEDICINE

## 2022-02-07 PROCEDURE — 3078F DIAST BP <80 MM HG: CPT | Performed by: FAMILY MEDICINE

## 2022-02-07 PROCEDURE — 3008F BODY MASS INDEX DOCD: CPT | Performed by: FAMILY MEDICINE

## 2022-02-07 PROCEDURE — 81003 URINALYSIS AUTO W/O SCOPE: CPT | Performed by: FAMILY MEDICINE

## 2022-02-07 RX ORDER — CLINDAMYCIN HYDROCHLORIDE 300 MG/1
300 CAPSULE ORAL 3 TIMES DAILY
Qty: 21 CAPSULE | Refills: 0 | Status: SHIPPED | OUTPATIENT
Start: 2022-02-07 | End: 2022-02-14

## 2022-02-17 ENCOUNTER — OFFICE VISIT (OUTPATIENT)
Dept: OBGYN CLINIC | Facility: CLINIC | Age: 37
End: 2022-02-17
Payer: COMMERCIAL

## 2022-02-17 VITALS
SYSTOLIC BLOOD PRESSURE: 102 MMHG | BODY MASS INDEX: 28.49 KG/M2 | HEIGHT: 68 IN | WEIGHT: 188 LBS | DIASTOLIC BLOOD PRESSURE: 60 MMHG

## 2022-02-17 DIAGNOSIS — Z01.419 WELL WOMAN EXAM WITH ROUTINE GYNECOLOGICAL EXAM: Primary | ICD-10-CM

## 2022-02-17 DIAGNOSIS — Z12.4 CERVICAL CANCER SCREENING: ICD-10-CM

## 2022-02-17 DIAGNOSIS — R19.09 GROIN LUMP: ICD-10-CM

## 2022-02-17 PROCEDURE — 99395 PREV VISIT EST AGE 18-39: CPT | Performed by: NURSE PRACTITIONER

## 2022-02-17 PROCEDURE — 87624 HPV HI-RISK TYP POOLED RSLT: CPT | Performed by: NURSE PRACTITIONER

## 2022-02-17 PROCEDURE — 3074F SYST BP LT 130 MM HG: CPT | Performed by: NURSE PRACTITIONER

## 2022-02-17 PROCEDURE — 87625 HPV TYPES 16 & 18 ONLY: CPT | Performed by: NURSE PRACTITIONER

## 2022-02-17 PROCEDURE — 3078F DIAST BP <80 MM HG: CPT | Performed by: NURSE PRACTITIONER

## 2022-02-17 PROCEDURE — 3008F BODY MASS INDEX DOCD: CPT | Performed by: NURSE PRACTITIONER

## 2022-02-21 ENCOUNTER — HOSPITAL ENCOUNTER (OUTPATIENT)
Dept: ULTRASOUND IMAGING | Age: 37
Discharge: HOME | End: 2022-02-21
Attending: NURSE PRACTITIONER
Payer: COMMERCIAL

## 2022-02-21 DIAGNOSIS — R19.09 GROIN LUMP: ICD-10-CM

## 2022-02-21 PROCEDURE — 76882 US LMTD JT/FCL EVL NVASC XTR: CPT | Performed by: NURSE PRACTITIONER

## 2022-02-23 ENCOUNTER — TELEPHONE (OUTPATIENT)
Dept: OBGYN CLINIC | Facility: CLINIC | Age: 37
End: 2022-02-23

## 2022-02-23 ENCOUNTER — TELEPHONE (OUTPATIENT)
Dept: FAMILY MEDICINE CLINIC | Facility: CLINIC | Age: 37
End: 2022-02-23

## 2022-02-23 NOTE — TELEPHONE ENCOUNTER
US was ordered by ob/gyne NP. Pt will need to contact their office to discuss results.   Called and spoke to pts  and gave him their office contact information [phone]

## 2022-02-23 NOTE — TELEPHONE ENCOUNTER
Discussed the non- specific findings of the US results. Advised that since this hasn't been here long and we don't know if it would resolve on its own, she could have it evaluated further with general surgery if she has continued pain or concerns.

## 2022-03-01 ENCOUNTER — PATIENT MESSAGE (OUTPATIENT)
Dept: OBGYN CLINIC | Facility: CLINIC | Age: 37
End: 2022-03-01

## 2022-03-01 ENCOUNTER — TELEPHONE (OUTPATIENT)
Dept: OBGYN CLINIC | Facility: CLINIC | Age: 37
End: 2022-03-01

## 2022-03-01 LAB
HPV I/H RISK 1 DNA SPEC QL NAA+PROBE: POSITIVE
HPV16 DNA CVX QL PROBE+SIG AMP: NEGATIVE
HPV18 DNA CVX QL PROBE+SIG AMP: NEGATIVE

## 2022-03-01 NOTE — TELEPHONE ENCOUNTER
Patient was concerned about Pap and HPV  Educated her about Pap and HPV  She knows we are waiting for the HPV 16, 18/45 test to determine a plan of care for her.   She wanted to know if she should keep her appointment on Thursday for IUD  She was notified to keep her appointment and check with Ceasar Lal to see if her results are final.

## 2022-03-09 ENCOUNTER — OFFICE VISIT (OUTPATIENT)
Dept: SURGERY | Facility: CLINIC | Age: 37
End: 2022-03-09
Payer: COMMERCIAL

## 2022-03-09 VITALS
WEIGHT: 188 LBS | HEIGHT: 66 IN | TEMPERATURE: 98 F | BODY MASS INDEX: 30.22 KG/M2 | SYSTOLIC BLOOD PRESSURE: 108 MMHG | DIASTOLIC BLOOD PRESSURE: 67 MMHG

## 2022-03-09 DIAGNOSIS — R10.2 PERINEAL PAIN IN FEMALE: Primary | ICD-10-CM

## 2022-03-09 PROBLEM — K80.20 CHOLELITHIASIS WITHOUT CHOLECYSTITIS: Status: RESOLVED | Noted: 2018-08-07 | Resolved: 2022-03-09

## 2022-03-09 PROCEDURE — 3078F DIAST BP <80 MM HG: CPT | Performed by: SURGERY

## 2022-03-09 PROCEDURE — 99242 OFF/OP CONSLTJ NEW/EST SF 20: CPT | Performed by: SURGERY

## 2022-03-09 PROCEDURE — 3008F BODY MASS INDEX DOCD: CPT | Performed by: SURGERY

## 2022-03-09 PROCEDURE — 3074F SYST BP LT 130 MM HG: CPT | Performed by: SURGERY

## 2022-03-18 ENCOUNTER — OFFICE VISIT (OUTPATIENT)
Dept: FAMILY MEDICINE CLINIC | Facility: CLINIC | Age: 37
End: 2022-03-18
Payer: COMMERCIAL

## 2022-03-18 VITALS
DIASTOLIC BLOOD PRESSURE: 74 MMHG | BODY MASS INDEX: 30.53 KG/M2 | TEMPERATURE: 98 F | RESPIRATION RATE: 19 BRPM | HEIGHT: 66 IN | WEIGHT: 190 LBS | OXYGEN SATURATION: 98 % | HEART RATE: 80 BPM | SYSTOLIC BLOOD PRESSURE: 110 MMHG

## 2022-03-18 DIAGNOSIS — M25.552 ISCHIAL PAIN, LEFT: Primary | ICD-10-CM

## 2022-03-18 DIAGNOSIS — L29.0 RECTAL ITCHING: ICD-10-CM

## 2022-03-18 PROCEDURE — 3074F SYST BP LT 130 MM HG: CPT | Performed by: FAMILY MEDICINE

## 2022-03-18 PROCEDURE — 99214 OFFICE O/P EST MOD 30 MIN: CPT | Performed by: FAMILY MEDICINE

## 2022-03-18 PROCEDURE — 3078F DIAST BP <80 MM HG: CPT | Performed by: FAMILY MEDICINE

## 2022-03-18 PROCEDURE — 3008F BODY MASS INDEX DOCD: CPT | Performed by: FAMILY MEDICINE

## 2022-03-18 NOTE — PATIENT INSTRUCTIONS
Over the counter   Preparation H Ointment  RELIEVES:Swelling, Burning, Itching, Discomfort  It temporarily shrinks swollen hemorrhoidal tissue and provides prompt, soothing relief from painful burning, itching and discomfort    Schedule MRI pelvis to evaluate the leg pain/tailbone pain   It will look for muscle tear in that region as well

## 2022-03-24 ENCOUNTER — LAB ENCOUNTER (OUTPATIENT)
Dept: LAB | Age: 37
End: 2022-03-24
Attending: FAMILY MEDICINE
Payer: COMMERCIAL

## 2022-03-24 DIAGNOSIS — R74.8 ELEVATED LIVER ENZYMES: ICD-10-CM

## 2022-03-24 DIAGNOSIS — E78.5 HYPERLIPIDEMIA, UNSPECIFIED HYPERLIPIDEMIA TYPE: ICD-10-CM

## 2022-03-24 LAB
ALBUMIN SERPL-MCNC: 3.8 G/DL (ref 3.4–5)
ALBUMIN/GLOB SERPL: 1.2 {RATIO} (ref 1–2)
ALP LIVER SERPL-CCNC: 81 U/L
ALT SERPL-CCNC: 48 U/L
ANION GAP SERPL CALC-SCNC: 5 MMOL/L (ref 0–18)
AST SERPL-CCNC: 24 U/L (ref 15–37)
BILIRUB SERPL-MCNC: 0.4 MG/DL (ref 0.1–2)
BUN BLD-MCNC: 18 MG/DL (ref 7–18)
CALCIUM BLD-MCNC: 9.4 MG/DL (ref 8.5–10.1)
CHLORIDE SERPL-SCNC: 105 MMOL/L (ref 98–112)
CHOLEST SERPL-MCNC: 235 MG/DL (ref ?–200)
CO2 SERPL-SCNC: 29 MMOL/L (ref 21–32)
CREAT BLD-MCNC: 0.66 MG/DL
FASTING PATIENT LIPID ANSWER: YES
FASTING STATUS PATIENT QL REPORTED: YES
GLOBULIN PLAS-MCNC: 3.3 G/DL (ref 2.8–4.4)
GLUCOSE BLD-MCNC: 91 MG/DL (ref 70–99)
HDLC SERPL-MCNC: 50 MG/DL (ref 40–59)
LDLC SERPL CALC-MCNC: 155 MG/DL (ref ?–100)
NONHDLC SERPL-MCNC: 185 MG/DL (ref ?–130)
OSMOLALITY SERPL CALC.SUM OF ELEC: 289 MOSM/KG (ref 275–295)
POTASSIUM SERPL-SCNC: 4.4 MMOL/L (ref 3.5–5.1)
PROT SERPL-MCNC: 7.1 G/DL (ref 6.4–8.2)
SODIUM SERPL-SCNC: 139 MMOL/L (ref 136–145)
TRIGL SERPL-MCNC: 163 MG/DL (ref 30–149)
TSI SER-ACNC: 1.04 MIU/ML (ref 0.36–3.74)
VLDLC SERPL CALC-MCNC: 31 MG/DL (ref 0–30)

## 2022-03-24 PROCEDURE — 84443 ASSAY THYROID STIM HORMONE: CPT

## 2022-03-24 PROCEDURE — 80061 LIPID PANEL: CPT

## 2022-03-24 PROCEDURE — 80053 COMPREHEN METABOLIC PANEL: CPT

## 2022-03-24 PROCEDURE — 36415 COLL VENOUS BLD VENIPUNCTURE: CPT

## 2022-05-18 ENCOUNTER — OFFICE VISIT (OUTPATIENT)
Dept: OBGYN CLINIC | Facility: CLINIC | Age: 37
End: 2022-05-18
Payer: COMMERCIAL

## 2022-05-18 VITALS
WEIGHT: 206.19 LBS | SYSTOLIC BLOOD PRESSURE: 112 MMHG | HEART RATE: 84 BPM | DIASTOLIC BLOOD PRESSURE: 76 MMHG | HEIGHT: 66 IN | BODY MASS INDEX: 33.14 KG/M2

## 2022-05-18 DIAGNOSIS — N76.0 VAGINITIS AND VULVOVAGINITIS: Primary | ICD-10-CM

## 2022-05-18 PROCEDURE — 87480 CANDIDA DNA DIR PROBE: CPT | Performed by: OBSTETRICS & GYNECOLOGY

## 2022-05-18 PROCEDURE — 3074F SYST BP LT 130 MM HG: CPT | Performed by: OBSTETRICS & GYNECOLOGY

## 2022-05-18 PROCEDURE — 99213 OFFICE O/P EST LOW 20 MIN: CPT | Performed by: OBSTETRICS & GYNECOLOGY

## 2022-05-18 PROCEDURE — 87510 GARDNER VAG DNA DIR PROBE: CPT | Performed by: OBSTETRICS & GYNECOLOGY

## 2022-05-18 PROCEDURE — 87491 CHLMYD TRACH DNA AMP PROBE: CPT | Performed by: OBSTETRICS & GYNECOLOGY

## 2022-05-18 PROCEDURE — 3008F BODY MASS INDEX DOCD: CPT | Performed by: OBSTETRICS & GYNECOLOGY

## 2022-05-18 PROCEDURE — 3078F DIAST BP <80 MM HG: CPT | Performed by: OBSTETRICS & GYNECOLOGY

## 2022-05-18 PROCEDURE — 87660 TRICHOMONAS VAGIN DIR PROBE: CPT | Performed by: OBSTETRICS & GYNECOLOGY

## 2022-05-18 PROCEDURE — 87591 N.GONORRHOEAE DNA AMP PROB: CPT | Performed by: OBSTETRICS & GYNECOLOGY

## 2022-05-19 LAB
C TRACH DNA SPEC QL NAA+PROBE: NEGATIVE
N GONORRHOEA DNA SPEC QL NAA+PROBE: NEGATIVE

## 2022-05-20 RX ORDER — METRONIDAZOLE 7.5 MG/G
1 GEL VAGINAL NIGHTLY
Qty: 70 G | Refills: 0 | Status: SHIPPED | OUTPATIENT
Start: 2022-05-20

## 2022-05-27 ENCOUNTER — TELEPHONE (OUTPATIENT)
Dept: ADMINISTRATIVE | Age: 37
End: 2022-05-27

## 2022-05-29 ENCOUNTER — TELEPHONE (OUTPATIENT)
Dept: FAMILY MEDICINE CLINIC | Facility: CLINIC | Age: 37
End: 2022-05-29

## 2022-05-29 DIAGNOSIS — M25.552 ISCHIAL PAIN, LEFT: Primary | ICD-10-CM

## 2022-05-29 NOTE — TELEPHONE ENCOUNTER
MRI pelvis denied by insurance      This referral has been denied, and will be closed as, Denied by Health Plan, pending provider decision for appeal.     MRI PELVIS

## 2022-05-31 NOTE — TELEPHONE ENCOUNTER
PT referral placed. Notified the patient of MRI denial and PT order. Patient verbalized understanding. Provided phone number to schedule PT. Answered all questions at this time.

## 2022-06-06 ENCOUNTER — OFFICE VISIT (OUTPATIENT)
Dept: FAMILY MEDICINE CLINIC | Facility: CLINIC | Age: 37
End: 2022-06-06
Payer: COMMERCIAL

## 2022-06-06 VITALS
HEART RATE: 90 BPM | HEIGHT: 66 IN | BODY MASS INDEX: 32.95 KG/M2 | WEIGHT: 205 LBS | TEMPERATURE: 98 F | DIASTOLIC BLOOD PRESSURE: 64 MMHG | RESPIRATION RATE: 21 BRPM | OXYGEN SATURATION: 99 % | SYSTOLIC BLOOD PRESSURE: 104 MMHG

## 2022-06-06 DIAGNOSIS — R63.5 WEIGHT GAIN, ABNORMAL: ICD-10-CM

## 2022-06-06 DIAGNOSIS — L02.215 ABSCESS OF SUPERFICIAL PERINEAL SPACE: Primary | ICD-10-CM

## 2022-06-06 PROCEDURE — 3074F SYST BP LT 130 MM HG: CPT | Performed by: FAMILY MEDICINE

## 2022-06-06 PROCEDURE — 99214 OFFICE O/P EST MOD 30 MIN: CPT | Performed by: FAMILY MEDICINE

## 2022-06-06 PROCEDURE — 3008F BODY MASS INDEX DOCD: CPT | Performed by: FAMILY MEDICINE

## 2022-06-06 PROCEDURE — 3078F DIAST BP <80 MM HG: CPT | Performed by: FAMILY MEDICINE

## 2022-06-06 RX ORDER — SULFAMETHOXAZOLE AND TRIMETHOPRIM 800; 160 MG/1; MG/1
1 TABLET ORAL 2 TIMES DAILY
Qty: 20 TABLET | Refills: 0 | Status: SHIPPED | OUTPATIENT
Start: 2022-06-06 | End: 2022-06-16

## 2022-07-19 ENCOUNTER — OFFICE VISIT (OUTPATIENT)
Dept: FAMILY MEDICINE CLINIC | Facility: CLINIC | Age: 37
End: 2022-07-19
Payer: COMMERCIAL

## 2022-07-19 ENCOUNTER — LAB ENCOUNTER (OUTPATIENT)
Dept: LAB | Age: 37
End: 2022-07-19
Attending: FAMILY MEDICINE
Payer: COMMERCIAL

## 2022-07-19 VITALS
WEIGHT: 203 LBS | OXYGEN SATURATION: 99 % | HEIGHT: 66 IN | RESPIRATION RATE: 21 BRPM | SYSTOLIC BLOOD PRESSURE: 104 MMHG | BODY MASS INDEX: 32.62 KG/M2 | TEMPERATURE: 98 F | HEART RATE: 75 BPM | DIASTOLIC BLOOD PRESSURE: 60 MMHG

## 2022-07-19 DIAGNOSIS — E78.5 HYPERLIPIDEMIA, UNSPECIFIED HYPERLIPIDEMIA TYPE: ICD-10-CM

## 2022-07-19 DIAGNOSIS — R63.5 WEIGHT GAIN, ABNORMAL: ICD-10-CM

## 2022-07-19 DIAGNOSIS — R74.8 ELEVATED LIVER ENZYMES: ICD-10-CM

## 2022-07-19 DIAGNOSIS — L02.215 ABSCESS OF SUPERFICIAL PERINEAL SPACE: ICD-10-CM

## 2022-07-19 DIAGNOSIS — L73.9 FOLLICULITIS: Primary | ICD-10-CM

## 2022-07-19 DIAGNOSIS — L73.9 FOLLICULITIS: ICD-10-CM

## 2022-07-19 LAB
ALBUMIN SERPL-MCNC: 3.6 G/DL (ref 3.4–5)
ALBUMIN/GLOB SERPL: 1 {RATIO} (ref 1–2)
ALP LIVER SERPL-CCNC: 85 U/L
ALT SERPL-CCNC: 106 U/L
ANION GAP SERPL CALC-SCNC: 6 MMOL/L (ref 0–18)
AST SERPL-CCNC: 42 U/L (ref 15–37)
BASOPHILS # BLD AUTO: 0.03 X10(3) UL (ref 0–0.2)
BASOPHILS NFR BLD AUTO: 0.7 %
BILIRUB SERPL-MCNC: 0.4 MG/DL (ref 0.1–2)
BUN BLD-MCNC: 12 MG/DL (ref 7–18)
CALCIUM BLD-MCNC: 8.9 MG/DL (ref 8.5–10.1)
CHLORIDE SERPL-SCNC: 112 MMOL/L (ref 98–112)
CHOLEST SERPL-MCNC: 237 MG/DL (ref ?–200)
CO2 SERPL-SCNC: 23 MMOL/L (ref 21–32)
CREAT BLD-MCNC: 0.64 MG/DL
EOSINOPHIL # BLD AUTO: 0.06 X10(3) UL (ref 0–0.7)
EOSINOPHIL NFR BLD AUTO: 1.4 %
ERYTHROCYTE [DISTWIDTH] IN BLOOD BY AUTOMATED COUNT: 13.2 %
EST. AVERAGE GLUCOSE BLD GHB EST-MCNC: 103 MG/DL (ref 68–126)
FASTING PATIENT LIPID ANSWER: YES
FASTING STATUS PATIENT QL REPORTED: YES
GLOBULIN PLAS-MCNC: 3.6 G/DL (ref 2.8–4.4)
GLUCOSE BLD-MCNC: 95 MG/DL (ref 70–99)
HBA1C MFR BLD: 5.2 % (ref ?–5.7)
HCT VFR BLD AUTO: 40.3 %
HDLC SERPL-MCNC: 57 MG/DL (ref 40–59)
HGB BLD-MCNC: 13.3 G/DL
IMM GRANULOCYTES # BLD AUTO: 0.01 X10(3) UL (ref 0–1)
IMM GRANULOCYTES NFR BLD: 0.2 %
LDLC SERPL CALC-MCNC: 164 MG/DL (ref ?–100)
LYMPHOCYTES # BLD AUTO: 2.01 X10(3) UL (ref 1–4)
LYMPHOCYTES NFR BLD AUTO: 46.2 %
MCH RBC QN AUTO: 30.4 PG (ref 26–34)
MCHC RBC AUTO-ENTMCNC: 33 G/DL (ref 31–37)
MCV RBC AUTO: 92.2 FL
MONOCYTES # BLD AUTO: 0.3 X10(3) UL (ref 0.1–1)
MONOCYTES NFR BLD AUTO: 6.9 %
NEUTROPHILS # BLD AUTO: 1.94 X10 (3) UL (ref 1.5–7.7)
NEUTROPHILS # BLD AUTO: 1.94 X10(3) UL (ref 1.5–7.7)
NEUTROPHILS NFR BLD AUTO: 44.6 %
NONHDLC SERPL-MCNC: 180 MG/DL (ref ?–130)
OSMOLALITY SERPL CALC.SUM OF ELEC: 292 MOSM/KG (ref 275–295)
PLATELET # BLD AUTO: 262 10(3)UL (ref 150–450)
POTASSIUM SERPL-SCNC: 3.9 MMOL/L (ref 3.5–5.1)
PROT SERPL-MCNC: 7.2 G/DL (ref 6.4–8.2)
RBC # BLD AUTO: 4.37 X10(6)UL
SODIUM SERPL-SCNC: 141 MMOL/L (ref 136–145)
TRIGL SERPL-MCNC: 92 MG/DL (ref 30–149)
TSI SER-ACNC: 0.42 MIU/ML (ref 0.36–3.74)
VLDLC SERPL CALC-MCNC: 18 MG/DL (ref 0–30)
WBC # BLD AUTO: 4.4 X10(3) UL (ref 4–11)

## 2022-07-19 PROCEDURE — 3074F SYST BP LT 130 MM HG: CPT | Performed by: FAMILY MEDICINE

## 2022-07-19 PROCEDURE — 3078F DIAST BP <80 MM HG: CPT | Performed by: FAMILY MEDICINE

## 2022-07-19 PROCEDURE — 80061 LIPID PANEL: CPT | Performed by: FAMILY MEDICINE

## 2022-07-19 PROCEDURE — 99213 OFFICE O/P EST LOW 20 MIN: CPT | Performed by: FAMILY MEDICINE

## 2022-07-19 PROCEDURE — 3008F BODY MASS INDEX DOCD: CPT | Performed by: FAMILY MEDICINE

## 2022-07-19 PROCEDURE — 87081 CULTURE SCREEN ONLY: CPT | Performed by: FAMILY MEDICINE

## 2022-07-19 PROCEDURE — 80050 GENERAL HEALTH PANEL: CPT | Performed by: FAMILY MEDICINE

## 2022-07-19 PROCEDURE — 83036 HEMOGLOBIN GLYCOSYLATED A1C: CPT | Performed by: FAMILY MEDICINE

## 2022-07-19 RX ORDER — CLINDAMYCIN HYDROCHLORIDE 300 MG/1
300 CAPSULE ORAL 3 TIMES DAILY
Qty: 30 CAPSULE | Refills: 0 | Status: SHIPPED | OUTPATIENT
Start: 2022-07-19 | End: 2022-07-29

## 2022-07-20 ENCOUNTER — TELEPHONE (OUTPATIENT)
Dept: FAMILY MEDICINE CLINIC | Facility: CLINIC | Age: 37
End: 2022-07-20

## 2022-07-22 NOTE — TELEPHONE ENCOUNTER
Awaited final culture results. [employer] message sent.
Patient reviewed test results on mychart, please call as she has some questions
Please advise on lab results from yesterday. Thank you.
Droplet precautions...

## 2022-08-15 ENCOUNTER — OFFICE VISIT (OUTPATIENT)
Dept: FAMILY MEDICINE CLINIC | Facility: CLINIC | Age: 37
End: 2022-08-15
Payer: COMMERCIAL

## 2022-08-15 VITALS
OXYGEN SATURATION: 98 % | RESPIRATION RATE: 16 BRPM | SYSTOLIC BLOOD PRESSURE: 102 MMHG | DIASTOLIC BLOOD PRESSURE: 60 MMHG | HEART RATE: 90 BPM | BODY MASS INDEX: 30.37 KG/M2 | WEIGHT: 189 LBS | HEIGHT: 66 IN | TEMPERATURE: 98 F

## 2022-08-15 DIAGNOSIS — E78.00 PURE HYPERCHOLESTEROLEMIA: ICD-10-CM

## 2022-08-15 DIAGNOSIS — E66.09 CLASS 1 OBESITY DUE TO EXCESS CALORIES WITHOUT SERIOUS COMORBIDITY WITH BODY MASS INDEX (BMI) OF 31.0 TO 31.9 IN ADULT: ICD-10-CM

## 2022-08-15 DIAGNOSIS — R74.8 ELEVATED LIVER ENZYMES: Primary | ICD-10-CM

## 2022-08-15 PROCEDURE — 3008F BODY MASS INDEX DOCD: CPT | Performed by: FAMILY MEDICINE

## 2022-08-15 PROCEDURE — 99214 OFFICE O/P EST MOD 30 MIN: CPT | Performed by: FAMILY MEDICINE

## 2022-08-15 PROCEDURE — 3078F DIAST BP <80 MM HG: CPT | Performed by: FAMILY MEDICINE

## 2022-08-15 PROCEDURE — 3074F SYST BP LT 130 MM HG: CPT | Performed by: FAMILY MEDICINE

## 2022-08-15 NOTE — PATIENT INSTRUCTIONS
Use preparation H ointment as needed for hemorrhoids     Continue diet and exercise     Repeat labs next month

## 2022-10-21 NOTE — ED AVS SNAPSHOT
Christina Celeste   MRN: [id number]    Department:  [facility] in [place]   Date of Visit:  7/8/2019           Disclosure     Insurance plans vary and the physician(s) referred by the ER may not be covered by your plan.  Please contact you tell this physician (or your personal doctor if your instructions are to return to your personal doctor) about any new or lasting problems. The primary care or specialist physician will see patients referred from the [facility] Emergency Department.  Uriah Suazo Negative

## 2022-11-28 ENCOUNTER — OFFICE VISIT (OUTPATIENT)
Dept: FAMILY MEDICINE CLINIC | Facility: CLINIC | Age: 37
End: 2022-11-28
Payer: COMMERCIAL

## 2022-11-28 VITALS
SYSTOLIC BLOOD PRESSURE: 100 MMHG | RESPIRATION RATE: 23 BRPM | OXYGEN SATURATION: 98 % | DIASTOLIC BLOOD PRESSURE: 60 MMHG | HEART RATE: 78 BPM | HEIGHT: 66 IN | BODY MASS INDEX: 28.77 KG/M2 | WEIGHT: 179 LBS | TEMPERATURE: 98 F

## 2022-11-28 DIAGNOSIS — R39.9 UTI SYMPTOMS: Primary | ICD-10-CM

## 2022-11-28 LAB
BILIRUBIN: NEGATIVE
CONTROL LINE PRESENT WITH A CLEAR BACKGROUND (YES/NO): YES YES/NO
GLUCOSE (URINE DIPSTICK): NEGATIVE MG/DL
KETONES (URINE DIPSTICK): NEGATIVE MG/DL
MULTISTIX LOT#: ABNORMAL NUMERIC
NITRITE, URINE: NEGATIVE
OCCULT BLOOD: NEGATIVE
PH, URINE: 7 (ref 4.5–8)
PROTEIN (URINE DIPSTICK): NEGATIVE MG/DL
SPECIFIC GRAVITY: 1.02 (ref 1–1.03)
URINE-COLOR: YELLOW
UROBILINOGEN,SEMI-QN: 1 MG/DL (ref 0–1.9)

## 2022-11-28 PROCEDURE — 87086 URINE CULTURE/COLONY COUNT: CPT | Performed by: FAMILY MEDICINE

## 2022-11-28 RX ORDER — NITROFURANTOIN 25; 75 MG/1; MG/1
100 CAPSULE ORAL 2 TIMES DAILY
Qty: 10 CAPSULE | Refills: 0 | Status: SHIPPED | OUTPATIENT
Start: 2022-11-28 | End: 2022-12-03

## 2023-02-26 ENCOUNTER — HOSPITAL ENCOUNTER (EMERGENCY)
Age: 38
Discharge: HOME | End: 2023-02-27
Attending: EMERGENCY MEDICINE
Payer: COMMERCIAL

## 2023-02-26 DIAGNOSIS — M54.41 ACUTE BILATERAL LOW BACK PAIN WITH BILATERAL SCIATICA: Primary | ICD-10-CM

## 2023-02-26 DIAGNOSIS — M54.42 ACUTE BILATERAL LOW BACK PAIN WITH BILATERAL SCIATICA: Primary | ICD-10-CM

## 2023-02-26 PROCEDURE — 99283 EMERGENCY DEPT VISIT LOW MDM: CPT

## 2023-02-26 PROCEDURE — 99284 EMERGENCY DEPT VISIT MOD MDM: CPT

## 2023-02-26 RX ORDER — PREDNISONE 20 MG/1
60 TABLET ORAL ONCE
Status: COMPLETED | OUTPATIENT
Start: 2023-02-26 | End: 2023-02-26

## 2023-02-26 RX ORDER — IBUPROFEN 600 MG/1
600 TABLET ORAL ONCE
Status: COMPLETED | OUTPATIENT
Start: 2023-02-26 | End: 2023-02-26

## 2023-02-26 RX ORDER — ACETAMINOPHEN 500 MG
1000 TABLET ORAL ONCE
Status: COMPLETED | OUTPATIENT
Start: 2023-02-26 | End: 2023-02-26

## 2023-02-27 ENCOUNTER — APPOINTMENT (OUTPATIENT)
Dept: GENERAL RADIOLOGY | Age: 38
End: 2023-02-27
Attending: EMERGENCY MEDICINE
Payer: COMMERCIAL

## 2023-02-27 VITALS
TEMPERATURE: 99 F | SYSTOLIC BLOOD PRESSURE: 100 MMHG | RESPIRATION RATE: 14 BRPM | HEIGHT: 68.9 IN | HEART RATE: 60 BPM | OXYGEN SATURATION: 99 % | BODY MASS INDEX: 25.92 KG/M2 | DIASTOLIC BLOOD PRESSURE: 61 MMHG | WEIGHT: 175 LBS

## 2023-02-27 PROCEDURE — 72110 X-RAY EXAM L-2 SPINE 4/>VWS: CPT | Performed by: EMERGENCY MEDICINE

## 2023-02-27 RX ORDER — PREDNISONE 20 MG/1
40 TABLET ORAL DAILY
Qty: 10 TABLET | Refills: 0 | Status: SHIPPED | OUTPATIENT
Start: 2023-02-27 | End: 2023-03-04

## 2023-02-27 NOTE — ED INITIAL ASSESSMENT (HPI)
39 y/o F arrives to ED with c/o back pain that radiates down her left leg. Patient reports pain started yesterday and has gotten progressively worse. Patient denies trauma/injury. Patient reports trying heat and cold with no pain relief.

## 2023-02-27 NOTE — DISCHARGE INSTRUCTIONS
Please follow-up with the Kerry [address] Dr neurology [facility] for further evaluation and reassessment. Take the medication as prescribed. Return for any worsening pain, weakness, or any concerning symptoms.

## 2023-03-07 ENCOUNTER — OFFICE VISIT (OUTPATIENT)
Facility: CLINIC | Age: 38
End: 2023-03-07
Payer: COMMERCIAL

## 2023-03-07 VITALS
HEART RATE: 71 BPM | SYSTOLIC BLOOD PRESSURE: 124 MMHG | HEIGHT: 68.9 IN | DIASTOLIC BLOOD PRESSURE: 77 MMHG | WEIGHT: 183 LBS | BODY MASS INDEX: 27.11 KG/M2

## 2023-03-07 DIAGNOSIS — Z01.419 ENCOUNTER FOR WELL WOMAN EXAM WITH ROUTINE GYNECOLOGICAL EXAM: Primary | ICD-10-CM

## 2023-03-07 DIAGNOSIS — Z12.4 CERVICAL CANCER SCREENING: ICD-10-CM

## 2023-03-07 PROCEDURE — 3074F SYST BP LT 130 MM HG: CPT | Performed by: OBSTETRICS & GYNECOLOGY

## 2023-03-07 PROCEDURE — 3008F BODY MASS INDEX DOCD: CPT | Performed by: OBSTETRICS & GYNECOLOGY

## 2023-03-07 PROCEDURE — 3078F DIAST BP <80 MM HG: CPT | Performed by: OBSTETRICS & GYNECOLOGY

## 2023-03-07 PROCEDURE — 87624 HPV HI-RISK TYP POOLED RSLT: CPT | Performed by: OBSTETRICS & GYNECOLOGY

## 2023-03-07 PROCEDURE — 99395 PREV VISIT EST AGE 18-39: CPT | Performed by: OBSTETRICS & GYNECOLOGY

## 2023-03-08 LAB — HPV I/H RISK 1 DNA SPEC QL NAA+PROBE: NEGATIVE

## 2023-03-17 ENCOUNTER — TELEPHONE (OUTPATIENT)
Dept: FAMILY MEDICINE CLINIC | Facility: CLINIC | Age: 38
End: 2023-03-17

## 2023-03-17 DIAGNOSIS — Z00.00 LABORATORY EXAMINATION ORDERED AS PART OF A ROUTINE GENERAL MEDICAL EXAMINATION: Primary | ICD-10-CM

## 2023-03-20 NOTE — TELEPHONE ENCOUNTER
From: Millie HOLLINGSWORTH  To: Katia Turner  Sent: 3/17/2023 4:43 PM CDT  Subject: lab orders    Katia Turner    Your lab orders have been placed. [address] lab hours  Monday - Friday 7 AM to 3:30 PM  Saturday 7 AM to 12:30 PM    Appointments are required. You can schedule via [employer] or visit our website [email or web address]. You can also schedule with her central scheduling department by calling [phone]. For fasting lab work, you may take your medications as normal, but with the exception of water, you should have nothing to eat or drink for 10 hours prior to your lab visit. Please contact your physician if you have any questions regarding fasting requirements.

## 2023-03-24 ENCOUNTER — TELEPHONE (OUTPATIENT)
Facility: CLINIC | Age: 38
End: 2023-03-24

## 2023-03-24 NOTE — TELEPHONE ENCOUNTER
Patient states she received her results online and that something does not look right. Please advise.

## 2023-03-24 NOTE — TELEPHONE ENCOUNTER
Spoke with pt. Saw her pap smear results in her mychart and had questions. Reviewed pap smear results with pt. Normal pap negative HPV. Verbalized understanding.

## 2023-05-13 ENCOUNTER — APPOINTMENT (OUTPATIENT)
Dept: CT IMAGING | Age: 38
End: 2023-05-13
Attending: PHYSICIAN ASSISTANT
Payer: COMMERCIAL

## 2023-05-13 ENCOUNTER — HOSPITAL ENCOUNTER (EMERGENCY)
Age: 38
Discharge: HOME | End: 2023-05-13
Attending: EMERGENCY MEDICINE
Payer: COMMERCIAL

## 2023-05-13 VITALS
HEIGHT: 68 IN | SYSTOLIC BLOOD PRESSURE: 126 MMHG | BODY MASS INDEX: 28.79 KG/M2 | TEMPERATURE: 98 F | WEIGHT: 190 LBS | RESPIRATION RATE: 15 BRPM | DIASTOLIC BLOOD PRESSURE: 72 MMHG | HEART RATE: 77 BPM | OXYGEN SATURATION: 99 %

## 2023-05-13 DIAGNOSIS — R10.9 ABDOMINAL PAIN OF UNKNOWN ETIOLOGY: Primary | ICD-10-CM

## 2023-05-13 DIAGNOSIS — R79.89 ELEVATED LFTS: ICD-10-CM

## 2023-05-13 LAB
ALBUMIN SERPL-MCNC: 3.7 G/DL (ref 3.4–5)
ALBUMIN/GLOB SERPL: 0.9 {RATIO} (ref 1–2)
ALP LIVER SERPL-CCNC: 122 U/L
ALT SERPL-CCNC: 99 U/L
ANION GAP SERPL CALC-SCNC: 3 MMOL/L (ref 0–18)
AST SERPL-CCNC: 51 U/L (ref 15–37)
B-HCG UR QL: NEGATIVE
BASOPHILS # BLD AUTO: 0.04 X10(3) UL (ref 0–0.2)
BASOPHILS NFR BLD AUTO: 0.7 %
BILIRUB SERPL-MCNC: 0.6 MG/DL (ref 0.1–2)
BILIRUB UR QL STRIP.AUTO: NEGATIVE
BUN BLD-MCNC: 9 MG/DL (ref 7–18)
CALCIUM BLD-MCNC: 9.6 MG/DL (ref 8.5–10.1)
CHLORIDE SERPL-SCNC: 105 MMOL/L (ref 98–112)
CLARITY UR REFRACT.AUTO: CLEAR
CO2 SERPL-SCNC: 27 MMOL/L (ref 21–32)
COLOR UR AUTO: COLORLESS
CREAT BLD-MCNC: 0.67 MG/DL
EOSINOPHIL # BLD AUTO: 0.05 X10(3) UL (ref 0–0.7)
EOSINOPHIL NFR BLD AUTO: 0.9 %
ERYTHROCYTE [DISTWIDTH] IN BLOOD BY AUTOMATED COUNT: 13.1 %
GFR SERPLBLD BASED ON 1.73 SQ M-ARVRAT: 115 ML/MIN/1.73M2 (ref 60–?)
GLOBULIN PLAS-MCNC: 4 G/DL (ref 2.8–4.4)
GLUCOSE BLD-MCNC: 97 MG/DL (ref 70–99)
GLUCOSE UR STRIP.AUTO-MCNC: NEGATIVE MG/DL
HCT VFR BLD AUTO: 39.3 %
HGB BLD-MCNC: 13.2 G/DL
IMM GRANULOCYTES # BLD AUTO: 0.01 X10(3) UL (ref 0–1)
IMM GRANULOCYTES NFR BLD: 0.2 %
KETONES UR STRIP.AUTO-MCNC: NEGATIVE MG/DL
LEUKOCYTE ESTERASE UR QL STRIP.AUTO: NEGATIVE
LIPASE SERPL-CCNC: 29 U/L (ref 13–75)
LYMPHOCYTES # BLD AUTO: 1.97 X10(3) UL (ref 1–4)
LYMPHOCYTES NFR BLD AUTO: 35.8 %
MCH RBC QN AUTO: 29.7 PG (ref 26–34)
MCHC RBC AUTO-ENTMCNC: 33.6 G/DL (ref 31–37)
MCV RBC AUTO: 88.3 FL
MONOCYTES # BLD AUTO: 0.31 X10(3) UL (ref 0.1–1)
MONOCYTES NFR BLD AUTO: 5.6 %
NEUTROPHILS # BLD AUTO: 3.12 X10 (3) UL (ref 1.5–7.7)
NEUTROPHILS # BLD AUTO: 3.12 X10(3) UL (ref 1.5–7.7)
NEUTROPHILS NFR BLD AUTO: 56.8 %
NITRITE UR QL STRIP.AUTO: NEGATIVE
OSMOLALITY SERPL CALC.SUM OF ELEC: 279 MOSM/KG (ref 275–295)
PH UR STRIP.AUTO: 7 [PH] (ref 5–8)
PLATELET # BLD AUTO: 281 10(3)UL (ref 150–450)
POTASSIUM SERPL-SCNC: 3.8 MMOL/L (ref 3.5–5.1)
PROT SERPL-MCNC: 7.7 G/DL (ref 6.4–8.2)
PROT UR STRIP.AUTO-MCNC: NEGATIVE MG/DL
RBC # BLD AUTO: 4.45 X10(6)UL
RBC UR QL AUTO: NEGATIVE
SODIUM SERPL-SCNC: 135 MMOL/L (ref 136–145)
SP GR UR STRIP.AUTO: 1.01 (ref 1–1.03)
UROBILINOGEN UR STRIP.AUTO-MCNC: 0.2 MG/DL
WBC # BLD AUTO: 5.5 X10(3) UL (ref 4–11)

## 2023-05-13 PROCEDURE — 85025 COMPLETE CBC W/AUTO DIFF WBC: CPT | Performed by: PHYSICIAN ASSISTANT

## 2023-05-13 PROCEDURE — 81003 URINALYSIS AUTO W/O SCOPE: CPT | Performed by: PHYSICIAN ASSISTANT

## 2023-05-13 PROCEDURE — 96361 HYDRATE IV INFUSION ADD-ON: CPT

## 2023-05-13 PROCEDURE — 99285 EMERGENCY DEPT VISIT HI MDM: CPT

## 2023-05-13 PROCEDURE — 74176 CT ABD & PELVIS W/O CONTRAST: CPT | Performed by: PHYSICIAN ASSISTANT

## 2023-05-13 PROCEDURE — 81025 URINE PREGNANCY TEST: CPT

## 2023-05-13 PROCEDURE — 80053 COMPREHEN METABOLIC PANEL: CPT | Performed by: PHYSICIAN ASSISTANT

## 2023-05-13 PROCEDURE — 85025 COMPLETE CBC W/AUTO DIFF WBC: CPT | Performed by: EMERGENCY MEDICINE

## 2023-05-13 PROCEDURE — 83690 ASSAY OF LIPASE: CPT | Performed by: EMERGENCY MEDICINE

## 2023-05-13 PROCEDURE — 99284 EMERGENCY DEPT VISIT MOD MDM: CPT

## 2023-05-13 PROCEDURE — 80053 COMPREHEN METABOLIC PANEL: CPT | Performed by: EMERGENCY MEDICINE

## 2023-05-13 PROCEDURE — 96374 THER/PROPH/DIAG INJ IV PUSH: CPT

## 2023-05-13 PROCEDURE — 83690 ASSAY OF LIPASE: CPT | Performed by: PHYSICIAN ASSISTANT

## 2023-05-13 RX ORDER — KETOROLAC TROMETHAMINE 30 MG/ML
30 INJECTION, SOLUTION INTRAMUSCULAR; INTRAVENOUS ONCE
Status: COMPLETED | OUTPATIENT
Start: 2023-05-13 | End: 2023-05-13

## 2023-05-13 RX ORDER — NICOTINE POLACRILEX 4 MG/1
20 GUM, CHEWING ORAL DAILY
Qty: 30 TABLET | Refills: 0 | Status: SHIPPED | OUTPATIENT
Start: 2023-05-13 | End: 2023-05-26

## 2023-05-13 NOTE — DISCHARGE INSTRUCTIONS
Take ibuprofen as needed for pain. Take the omeprazole as prescribed. Keep a bland diet until your symptoms improve, then slowly advance as tolerated. Follow-up with your primary care provider. If you have new, changing or worsening symptoms, please go directly to the ER.

## 2023-05-13 NOTE — ED PROVIDER NOTES
The patient was seen and examined. Note reviewed and agreed. I provided a substantive portion of the care of this patient. I personally performed the Medical Decision Making for this encounter. Patient states that she has had 4 days worth of pain which is now constant and unrelieved with acetaminophen. No vomiting or diarrhea but diminished appetite. Last bowel movement was within the past 24 hours and was normal.  Some back pain with this primarily in the right flank. On examination, notable for some minimal epigastric tenderness and minimal suprapubic tenderness but no masses rebound or guarding. Bowel sounds are normal active. Modest elevations noted on LFTs. CT pending. Differential diagnosis includes but is not limited to common duct stone, diverticulitis, appendicitis. Await imaging studies to determine plan of care.

## 2023-05-13 NOTE — ED INITIAL ASSESSMENT (HPI)
To er with c/o right flank pain for past 4 days. Last tylenol for pain was yesterday. Nausea without vomiting. Denies diarrhea. denies urinary sx

## 2023-05-26 ENCOUNTER — OFFICE VISIT (OUTPATIENT)
Dept: FAMILY MEDICINE CLINIC | Facility: CLINIC | Age: 38
End: 2023-05-26
Payer: COMMERCIAL

## 2023-05-26 VITALS
HEIGHT: 68 IN | OXYGEN SATURATION: 99 % | SYSTOLIC BLOOD PRESSURE: 80 MMHG | TEMPERATURE: 98 F | HEART RATE: 83 BPM | RESPIRATION RATE: 16 BRPM | DIASTOLIC BLOOD PRESSURE: 50 MMHG | BODY MASS INDEX: 27.43 KG/M2 | WEIGHT: 181 LBS

## 2023-05-26 DIAGNOSIS — L91.8 ACQUIRED SKIN TAG: ICD-10-CM

## 2023-05-26 DIAGNOSIS — E78.00 PURE HYPERCHOLESTEROLEMIA: ICD-10-CM

## 2023-05-26 DIAGNOSIS — L21.9 SEBORRHEIC DERMATITIS OF SCALP: ICD-10-CM

## 2023-05-26 DIAGNOSIS — Z00.00 ROUTINE PHYSICAL EXAMINATION: Primary | ICD-10-CM

## 2023-05-26 DIAGNOSIS — R74.8 ELEVATED LIVER ENZYMES: ICD-10-CM

## 2023-05-26 PROCEDURE — 88304 TISSUE EXAM BY PATHOLOGIST: CPT | Performed by: FAMILY MEDICINE

## 2023-05-26 RX ORDER — KETOCONAZOLE 20 MG/ML
15 SHAMPOO TOPICAL
Qty: 200 ML | Refills: 0 | Status: SHIPPED | OUTPATIENT
Start: 2023-05-29

## 2023-05-26 RX ORDER — LIDOCAINE HYDROCHLORIDE 10 MG/ML
0.5 INJECTION, SOLUTION EPIDURAL; INFILTRATION; INTRACAUDAL; PERINEURAL ONCE
Status: COMPLETED | OUTPATIENT
Start: 2023-05-26 | End: 2023-05-26

## 2023-05-26 RX ORDER — OMEPRAZOLE 40 MG/1
40 CAPSULE, DELAYED RELEASE ORAL DAILY
Qty: 90 CAPSULE | Refills: 1 | Status: SHIPPED | OUTPATIENT
Start: 2023-05-26 | End: 2023-11-22

## 2023-05-26 RX ORDER — NICOTINE POLACRILEX 4 MG/1
20 GUM, CHEWING ORAL DAILY
Qty: 90 TABLET | Refills: 0 | Status: CANCELLED | OUTPATIENT
Start: 2023-05-26 | End: 2023-06-25

## 2023-05-26 RX ADMIN — LIDOCAINE HYDROCHLORIDE 0.5 ML: 10 INJECTION, SOLUTION EPIDURAL; INFILTRATION; INTRACAUDAL; PERINEURAL at 15:00:00

## 2023-10-27 ENCOUNTER — PATIENT MESSAGE (OUTPATIENT)
Dept: FAMILY MEDICINE CLINIC | Facility: CLINIC | Age: 38
End: 2023-10-27

## 2023-10-30 NOTE — TELEPHONE ENCOUNTER
From: Art Milian  To: Jonah Keller  Sent: 10/27/2023 10:31 PM CDT  Subject: Breast pain    Hello, i have an appointment with you on 11.14  i have a really bad pain in my tight breast and armpit. there are more than 7 days now. Can you please make a referral for a mammogram asap?

## 2023-10-31 ENCOUNTER — OFFICE VISIT (OUTPATIENT)
Dept: FAMILY MEDICINE CLINIC | Facility: CLINIC | Age: 38
End: 2023-10-31

## 2023-10-31 VITALS
RESPIRATION RATE: 16 BRPM | WEIGHT: 199 LBS | BODY MASS INDEX: 30.16 KG/M2 | HEIGHT: 68 IN | SYSTOLIC BLOOD PRESSURE: 100 MMHG | OXYGEN SATURATION: 98 % | DIASTOLIC BLOOD PRESSURE: 60 MMHG | HEART RATE: 88 BPM

## 2023-10-31 DIAGNOSIS — L02.91 ABSCESS: ICD-10-CM

## 2023-10-31 DIAGNOSIS — N64.4 BREAST PAIN, RIGHT: Primary | ICD-10-CM

## 2023-10-31 PROCEDURE — 3008F BODY MASS INDEX DOCD: CPT | Performed by: NURSE PRACTITIONER

## 2023-10-31 PROCEDURE — 3074F SYST BP LT 130 MM HG: CPT | Performed by: NURSE PRACTITIONER

## 2023-10-31 PROCEDURE — 99214 OFFICE O/P EST MOD 30 MIN: CPT | Performed by: NURSE PRACTITIONER

## 2023-10-31 PROCEDURE — 3078F DIAST BP <80 MM HG: CPT | Performed by: NURSE PRACTITIONER

## 2023-10-31 RX ORDER — AMOXICILLIN AND CLAVULANATE POTASSIUM 875; 125 MG/1; MG/1
1 TABLET, FILM COATED ORAL 2 TIMES DAILY
Qty: 20 TABLET | Refills: 0 | Status: SHIPPED | OUTPATIENT
Start: 2023-10-31 | End: 2023-11-10

## 2023-11-09 ENCOUNTER — HOSPITAL ENCOUNTER (OUTPATIENT)
Dept: ULTRASOUND IMAGING | Age: 38
Discharge: HOME | End: 2023-11-09
Attending: NURSE PRACTITIONER
Payer: COMMERCIAL

## 2023-11-09 ENCOUNTER — HOSPITAL ENCOUNTER (OUTPATIENT)
Dept: MAMMOGRAPHY | Age: 38
Discharge: HOME | End: 2023-11-09
Attending: NURSE PRACTITIONER
Payer: COMMERCIAL

## 2023-11-09 DIAGNOSIS — N64.4 BREAST PAIN, RIGHT: ICD-10-CM

## 2023-11-09 PROCEDURE — 77066 DX MAMMO INCL CAD BI: CPT | Performed by: NURSE PRACTITIONER

## 2023-11-09 PROCEDURE — 76642 ULTRASOUND BREAST LIMITED: CPT | Performed by: NURSE PRACTITIONER

## 2023-11-09 PROCEDURE — 77062 BREAST TOMOSYNTHESIS BI: CPT | Performed by: NURSE PRACTITIONER

## 2023-11-13 ENCOUNTER — TELEPHONE (OUTPATIENT)
Dept: FAMILY MEDICINE CLINIC | Facility: CLINIC | Age: 38
End: 2023-11-13

## 2023-11-14 ENCOUNTER — LAB ENCOUNTER (OUTPATIENT)
Dept: LAB | Age: 38
End: 2023-11-14
Attending: FAMILY MEDICINE
Payer: COMMERCIAL

## 2023-11-14 ENCOUNTER — OFFICE VISIT (OUTPATIENT)
Dept: FAMILY MEDICINE CLINIC | Facility: CLINIC | Age: 38
End: 2023-11-14
Payer: COMMERCIAL

## 2023-11-14 VITALS
WEIGHT: 197 LBS | RESPIRATION RATE: 16 BRPM | BODY MASS INDEX: 29.86 KG/M2 | TEMPERATURE: 98 F | DIASTOLIC BLOOD PRESSURE: 60 MMHG | HEART RATE: 62 BPM | OXYGEN SATURATION: 98 % | SYSTOLIC BLOOD PRESSURE: 100 MMHG | HEIGHT: 68 IN

## 2023-11-14 DIAGNOSIS — R74.8 ELEVATED LIVER ENZYMES: ICD-10-CM

## 2023-11-14 DIAGNOSIS — R42 LIGHTHEADEDNESS: ICD-10-CM

## 2023-11-14 DIAGNOSIS — E78.00 PURE HYPERCHOLESTEROLEMIA: ICD-10-CM

## 2023-11-14 DIAGNOSIS — N64.4 BREAST PAIN, RIGHT: ICD-10-CM

## 2023-11-14 DIAGNOSIS — Z00.00 LABORATORY EXAMINATION ORDERED AS PART OF A ROUTINE GENERAL MEDICAL EXAMINATION: ICD-10-CM

## 2023-11-14 DIAGNOSIS — R42 LIGHTHEADEDNESS: Primary | ICD-10-CM

## 2023-11-14 LAB
ALBUMIN SERPL-MCNC: 4.1 G/DL (ref 3.4–5)
ALBUMIN/GLOB SERPL: 1.1 {RATIO} (ref 1–2)
ALP LIVER SERPL-CCNC: 73 U/L
ALT SERPL-CCNC: 38 U/L
ANION GAP SERPL CALC-SCNC: 3 MMOL/L (ref 0–18)
AST SERPL-CCNC: 24 U/L (ref 15–37)
BASOPHILS # BLD AUTO: 0.04 X10(3) UL (ref 0–0.2)
BASOPHILS NFR BLD AUTO: 0.5 %
BILIRUB DIRECT SERPL-MCNC: 0.1 MG/DL (ref 0–0.2)
BILIRUB SERPL-MCNC: 0.6 MG/DL (ref 0.1–2)
BUN BLD-MCNC: 9 MG/DL (ref 9–23)
CALCIUM BLD-MCNC: 9.2 MG/DL (ref 8.5–10.1)
CHLORIDE SERPL-SCNC: 105 MMOL/L (ref 98–112)
CHOLEST SERPL-MCNC: 211 MG/DL (ref ?–200)
CO2 SERPL-SCNC: 32 MMOL/L (ref 21–32)
CREAT BLD-MCNC: 0.79 MG/DL
EGFRCR SERPLBLD CKD-EPI 2021: 99 ML/MIN/1.73M2 (ref 60–?)
EOSINOPHIL # BLD AUTO: 0.05 X10(3) UL (ref 0–0.7)
EOSINOPHIL NFR BLD AUTO: 0.7 %
ERYTHROCYTE [DISTWIDTH] IN BLOOD BY AUTOMATED COUNT: 12.7 %
FASTING PATIENT LIPID ANSWER: YES
FASTING STATUS PATIENT QL REPORTED: YES
GLOBULIN PLAS-MCNC: 3.8 G/DL (ref 2.8–4.4)
GLUCOSE BLD-MCNC: 81 MG/DL (ref 70–99)
HCT VFR BLD AUTO: 41.8 %
HDLC SERPL-MCNC: 50 MG/DL (ref 40–59)
HGB BLD-MCNC: 14.2 G/DL
IMM GRANULOCYTES # BLD AUTO: 0.02 X10(3) UL (ref 0–1)
IMM GRANULOCYTES NFR BLD: 0.3 %
LDLC SERPL CALC-MCNC: 140 MG/DL (ref ?–100)
LYMPHOCYTES # BLD AUTO: 2.37 X10(3) UL (ref 1–4)
LYMPHOCYTES NFR BLD AUTO: 32.1 %
MAGNESIUM SERPL-MCNC: 2.1 MG/DL (ref 1.6–2.6)
MCH RBC QN AUTO: 29.9 PG (ref 26–34)
MCHC RBC AUTO-ENTMCNC: 34 G/DL (ref 31–37)
MCV RBC AUTO: 88 FL
MONOCYTES # BLD AUTO: 0.36 X10(3) UL (ref 0.1–1)
MONOCYTES NFR BLD AUTO: 4.9 %
NEUTROPHILS # BLD AUTO: 4.54 X10 (3) UL (ref 1.5–7.7)
NEUTROPHILS # BLD AUTO: 4.54 X10(3) UL (ref 1.5–7.7)
NEUTROPHILS NFR BLD AUTO: 61.5 %
NONHDLC SERPL-MCNC: 161 MG/DL (ref ?–130)
OSMOLALITY SERPL CALC.SUM OF ELEC: 288 MOSM/KG (ref 275–295)
PHOSPHATE SERPL-MCNC: 2.8 MG/DL (ref 2.5–4.9)
PLATELET # BLD AUTO: 292 10(3)UL (ref 150–450)
POTASSIUM SERPL-SCNC: 3.9 MMOL/L (ref 3.5–5.1)
PROT SERPL-MCNC: 7.9 G/DL (ref 6.4–8.2)
RBC # BLD AUTO: 4.75 X10(6)UL
SODIUM SERPL-SCNC: 140 MMOL/L (ref 136–145)
TRIGL SERPL-MCNC: 116 MG/DL (ref 30–149)
TSI SER-ACNC: 0.45 MIU/ML (ref 0.36–3.74)
VLDLC SERPL CALC-MCNC: 21 MG/DL (ref 0–30)
WBC # BLD AUTO: 7.4 X10(3) UL (ref 4–11)

## 2023-11-14 PROCEDURE — 99214 OFFICE O/P EST MOD 30 MIN: CPT | Performed by: FAMILY MEDICINE

## 2023-11-14 PROCEDURE — 3008F BODY MASS INDEX DOCD: CPT | Performed by: FAMILY MEDICINE

## 2023-11-14 PROCEDURE — 80050 GENERAL HEALTH PANEL: CPT | Performed by: FAMILY MEDICINE

## 2023-11-14 PROCEDURE — 80061 LIPID PANEL: CPT | Performed by: FAMILY MEDICINE

## 2023-11-14 PROCEDURE — 84100 ASSAY OF PHOSPHORUS: CPT | Performed by: FAMILY MEDICINE

## 2023-11-14 PROCEDURE — 83735 ASSAY OF MAGNESIUM: CPT | Performed by: FAMILY MEDICINE

## 2023-11-14 PROCEDURE — 3074F SYST BP LT 130 MM HG: CPT | Performed by: FAMILY MEDICINE

## 2023-11-14 PROCEDURE — 3078F DIAST BP <80 MM HG: CPT | Performed by: FAMILY MEDICINE

## 2023-11-14 PROCEDURE — 82248 BILIRUBIN DIRECT: CPT | Performed by: FAMILY MEDICINE

## 2023-11-14 PROCEDURE — 83036 HEMOGLOBIN GLYCOSYLATED A1C: CPT | Performed by: FAMILY MEDICINE

## 2023-11-14 NOTE — PATIENT INSTRUCTIONS
Do not fast longer than 18 hours/day   Aim for [id number] linda per day   To lose weight with that number of calories, increase/modify physical activity. Make sure you are doing some strength training for exercise, not cardio alone     If labs are normal we may need to complete heart studies and/or send you to a cardiologist     If breast pain worsens, we need to complete an MRI.

## 2023-11-15 LAB
EST. AVERAGE GLUCOSE BLD GHB EST-MCNC: 103 MG/DL (ref 68–126)
HBA1C MFR BLD: 5.2 % (ref ?–5.7)

## 2023-11-15 RX ORDER — KETOCONAZOLE 20 MG/ML
15 SHAMPOO TOPICAL
Qty: 200 ML | Refills: 3 | Status: SHIPPED | OUTPATIENT
Start: 2023-11-16

## 2024-01-10 ENCOUNTER — APPOINTMENT (OUTPATIENT)
Dept: GENERAL RADIOLOGY | Age: 39
End: 2024-01-10
Attending: EMERGENCY MEDICINE
Payer: COMMERCIAL

## 2024-01-10 ENCOUNTER — HOSPITAL ENCOUNTER (EMERGENCY)
Age: 39
Discharge: HOME | End: 2024-01-10
Attending: EMERGENCY MEDICINE
Payer: COMMERCIAL

## 2024-01-10 VITALS
HEART RATE: 55 BPM | RESPIRATION RATE: 17 BRPM | OXYGEN SATURATION: 96 % | TEMPERATURE: 99 F | SYSTOLIC BLOOD PRESSURE: 102 MMHG | WEIGHT: 200 LBS | DIASTOLIC BLOOD PRESSURE: 57 MMHG | HEIGHT: 68.9 IN | BODY MASS INDEX: 29.62 KG/M2

## 2024-01-10 DIAGNOSIS — R07.9 CHEST PAIN OF UNCERTAIN ETIOLOGY: Primary | ICD-10-CM

## 2024-01-10 LAB
ALBUMIN SERPL-MCNC: 3.6 G/DL (ref 3.4–5)
ALBUMIN/GLOB SERPL: 0.9 {RATIO} (ref 1–2)
ALP LIVER SERPL-CCNC: 97 U/L
ALT SERPL-CCNC: 59 U/L
ANION GAP SERPL CALC-SCNC: 5 MMOL/L (ref 0–18)
AST SERPL-CCNC: 25 U/L (ref 15–37)
BASOPHILS # BLD AUTO: 0.05 X10(3) UL (ref 0–0.2)
BASOPHILS NFR BLD AUTO: 0.6 %
BILIRUB SERPL-MCNC: 0.3 MG/DL (ref 0.1–2)
BUN BLD-MCNC: 11 MG/DL (ref 9–23)
CALCIUM BLD-MCNC: 8.6 MG/DL (ref 8.5–10.1)
CHLORIDE SERPL-SCNC: 108 MMOL/L (ref 98–112)
CO2 SERPL-SCNC: 26 MMOL/L (ref 21–32)
CREAT BLD-MCNC: 0.69 MG/DL
D DIMER PPP FEU-MCNC: <0.27 UG/ML FEU (ref ?–0.5)
EGFRCR SERPLBLD CKD-EPI 2021: 115 ML/MIN/1.73M2 (ref 60–?)
EOSINOPHIL # BLD AUTO: 0.09 X10(3) UL (ref 0–0.7)
EOSINOPHIL NFR BLD AUTO: 1.1 %
ERYTHROCYTE [DISTWIDTH] IN BLOOD BY AUTOMATED COUNT: 13.1 %
GLOBULIN PLAS-MCNC: 3.8 G/DL (ref 2.8–4.4)
GLUCOSE BLD-MCNC: 85 MG/DL (ref 70–99)
HCG SERPL QL: NEGATIVE
HCT VFR BLD AUTO: 39.6 %
HGB BLD-MCNC: 13.6 G/DL
IMM GRANULOCYTES # BLD AUTO: 0.03 X10(3) UL (ref 0–1)
IMM GRANULOCYTES NFR BLD: 0.4 %
LYMPHOCYTES # BLD AUTO: 2.91 X10(3) UL (ref 1–4)
LYMPHOCYTES NFR BLD AUTO: 35.6 %
MCH RBC QN AUTO: 30.3 PG (ref 26–34)
MCHC RBC AUTO-ENTMCNC: 34.3 G/DL (ref 31–37)
MCV RBC AUTO: 88.2 FL
MONOCYTES # BLD AUTO: 0.45 X10(3) UL (ref 0.1–1)
MONOCYTES NFR BLD AUTO: 5.5 %
NEUTROPHILS # BLD AUTO: 4.65 X10 (3) UL (ref 1.5–7.7)
NEUTROPHILS # BLD AUTO: 4.65 X10(3) UL (ref 1.5–7.7)
NEUTROPHILS NFR BLD AUTO: 56.8 %
OSMOLALITY SERPL CALC.SUM OF ELEC: 287 MOSM/KG (ref 275–295)
PLATELET # BLD AUTO: 347 10(3)UL (ref 150–450)
POTASSIUM SERPL-SCNC: 3.8 MMOL/L (ref 3.5–5.1)
PROT SERPL-MCNC: 7.4 G/DL (ref 6.4–8.2)
RBC # BLD AUTO: 4.49 X10(6)UL
SODIUM SERPL-SCNC: 139 MMOL/L (ref 136–145)
TROPONIN I SERPL HS-MCNC: <3 NG/L
WBC # BLD AUTO: 8.2 X10(3) UL (ref 4–11)

## 2024-01-10 PROCEDURE — 85025 COMPLETE CBC W/AUTO DIFF WBC: CPT | Performed by: EMERGENCY MEDICINE

## 2024-01-10 PROCEDURE — 99285 EMERGENCY DEPT VISIT HI MDM: CPT

## 2024-01-10 PROCEDURE — 96374 THER/PROPH/DIAG INJ IV PUSH: CPT

## 2024-01-10 PROCEDURE — 80053 COMPREHEN METABOLIC PANEL: CPT | Performed by: EMERGENCY MEDICINE

## 2024-01-10 PROCEDURE — 84703 CHORIONIC GONADOTROPIN ASSAY: CPT | Performed by: EMERGENCY MEDICINE

## 2024-01-10 PROCEDURE — 85379 FIBRIN DEGRADATION QUANT: CPT | Performed by: EMERGENCY MEDICINE

## 2024-01-10 PROCEDURE — 71045 X-RAY EXAM CHEST 1 VIEW: CPT | Performed by: EMERGENCY MEDICINE

## 2024-01-10 PROCEDURE — 93005 ELECTROCARDIOGRAM TRACING: CPT

## 2024-01-10 PROCEDURE — 84484 ASSAY OF TROPONIN QUANT: CPT | Performed by: EMERGENCY MEDICINE

## 2024-01-10 PROCEDURE — 93010 ELECTROCARDIOGRAM REPORT: CPT

## 2024-01-10 RX ORDER — KETOROLAC TROMETHAMINE 15 MG/ML
15 INJECTION, SOLUTION INTRAMUSCULAR; INTRAVENOUS ONCE
Status: COMPLETED | OUTPATIENT
Start: 2024-01-10 | End: 2024-01-10

## 2024-01-11 ENCOUNTER — PATIENT MESSAGE (OUTPATIENT)
Dept: FAMILY MEDICINE CLINIC | Facility: CLINIC | Age: 39
End: 2024-01-11

## 2024-01-11 LAB
ATRIAL RATE: 68 BPM
P AXIS: 59 DEGREES
P-R INTERVAL: 174 MS
Q-T INTERVAL: 400 MS
QRS DURATION: 90 MS
QTC CALCULATION (BEZET): 425 MS
R AXIS: 38 DEGREES
T AXIS: 38 DEGREES
VENTRICULAR RATE: 68 BPM

## 2024-01-11 NOTE — DISCHARGE INSTRUCTIONS
Follow-up with your primary care doctor within the next week for reassessment.  Return for new or worsening pain, difficulty breathing or any other concerning symptoms.

## 2024-01-11 NOTE — ED INITIAL ASSESSMENT (HPI)
Patient having pressure in chest with difficulty breathing 4-5 days. EKG completed shows nonspecific T-wave abnormality.

## 2024-01-11 NOTE — ED PROVIDER NOTES
Patient Seen in: [facility] In [place]      History     Chief Complaint   Patient presents with    Chest Pain Angina     Stated Complaint: shortess of breath and chest pain. Sent by urgent care to t-wave abnormality    Subjective:   HPI    38-year-old female with past medical history of hyperlipidemia presents today for evaluation of chest pain.  For the past 1 week, patient has experienced a bilateral chest discomfort.  The pain had been intermittent although had been constant throughout the day today.  She felt like she had a hard time taking a deep breath today.  The pain is nonradiating.  She denies any fevers, cough, leg swelling, hemoptysis or recent travel.  She was seen at an immediate care and found to have an EKG that had nonspecific T wave abnormalities and she was advised to come to the ER for evaluation.  Patient vapes.  She denies any early family history of CVA/CAD.  She has no history of hypertension or diabetes.    Patient states she does not need a Haitian  and declined  services    Objective:   Past Medical History:   Diagnosis Date    Cholelithiasis without cholecystitis 08/07/2018    Chronic cystitis     Chronic pyelonephritis     Gestational diabetes     Hyperlipidemia     Problems with swallowing     Shortness of breath               Past Surgical History:   Procedure Laterality Date    CHOLECYSTECTOMY  08/22/2018                Social History     Socioeconomic History    Marital status:    Tobacco Use    Smoking status: Former     Packs/day: 0.00     Years: 10.00     Additional pack years: 0.00     Total pack years: 0.00     Types: Cigarettes    Smokeless tobacco: Never    Tobacco comments:     quit [id number]   Vaping Use    Vaping Use: Some days    Substances: Nicotine   Substance and Sexual Activity    Alcohol use: Never    Drug use: Never    Sexual activity: Yes     Partners: Male     Birth control/protection: I.U.D.     Comment: mirena  IUD    Other Topics Concern    Caffeine Concern Yes    Stress Concern No    Weight Concern Yes    Special Diet No    Exercise No    Seat Belt No              Review of Systems    Positive for stated complaint: shortess of breath and chest pain. Sent by urgent care to t-wave abnormality  Other systems are as noted in HPI.  Constitutional and vital signs reviewed.      All other systems reviewed and negative except as noted above.    Physical Exam     ED Triage Vitals [01/10/24 1958]   /74   Pulse 75   Resp 18   Temp 98.6 °F (37 °C)   Temp src Oral   SpO2 97 %   O2 Device None (Room air)       Current:/65   Pulse 68   Temp 98.6 °F (37 °C) (Oral)   Resp 16   Ht 175 cm (5' 8.9\")   Wt 90.7 kg   LMP  (LMP Unknown)   SpO2 96%   BMI 29.62 kg/m²         Physical Exam  Vitals and nursing note reviewed.   Constitutional:       Appearance: Normal appearance.   HENT:      Head: Normocephalic.      Nose: Nose normal.      Mouth/Throat:      Mouth: Mucous membranes are moist.   Eyes:      Extraocular Movements: Extraocular movements intact.   Cardiovascular:      Rate and Rhythm: Normal rate and regular rhythm.   Pulmonary:      Effort: Pulmonary effort is normal.      Breath sounds: Normal breath sounds.   Abdominal:      General: Abdomen is flat.   Musculoskeletal:         General: Normal range of motion.   Skin:     General: Skin is warm.   Neurological:      General: No focal deficit present.      Mental Status: She is alert.   Psychiatric:         Mood and Affect: Mood normal.           ED Course     Labs Reviewed   COMP METABOLIC PANEL (14) - Abnormal; Notable for the following components:       Result Value    ALT 59 (*)     A/G Ratio 0.9 (*)     All other components within normal limits   TROPONIN I HIGH SENSITIVITY - Normal   D-DIMER - Normal   HCG, BETA SUBUNIT, QUAL - Normal   CBC WITH DIFFERENTIAL WITH PLATELET    Narrative:     The following orders were created for panel order CBC With Differential  With Platelet.  Procedure                               Abnormality         Status                     ---------                               -----------         ------                     CBC W/ DIFFERENTIAL[[id number]]                              Final result                 Please view results for these tests on the individual orders.   CBC W/ DIFFERENTIAL     EKG    Rate, intervals and axes as noted on EKG Report.  Rate: 68  Rhythm: Sinus Rhythm  Reading: no stemi                 XR CHEST AP PORTABLE  (CPT=71045)    Result Date: 1/10/2024  PROCEDURE:  XR CHEST AP PORTABLE  (CPT=71045)  TECHNIQUE:  AP chest radiograph was obtained.  COMPARISON:  [facility], XR, XR CHEST PA + LAT CHEST (NXX=[id number]), 1/03/2022, 4:01 PM.  INDICATIONS:  shortess of breath and chest pain. Sent by urgent care to t-wave abnormality  PATIENT STATED HISTORY: (As transcribed by Technologist)  Day 5 mid chest pain.   FINDINGS:  The cardiomediastinal silhouette is within normal limits.  There is no consolidation, effusion, or pneumothorax.  No aggressive osseous lesions are identified.            CONCLUSION: No acute cardiopulmonary abnormality.   LOCATION:  [facility]      Dictated by (CST): Speedy Wade MD on 1/10/2024 at 8:56 PM     Finalized by (CST): Speedy Wade MD on 1/10/2024 at 8:57 PM          Heart Score:    HEART Score      Title      Criteria Score   Age: <45 Age Score: 0   History: Slightly Suspicious Hx Score: 0     EKG: Non-Spec Changes EKG Score: 1   HTN: No   Hypercholesterolemia: Yes   Atherosclerosis/PVD: No     DM: No   BMI>30kg/m2: No   Smoking: Yes   Family History: No         Other Risk Factor Score: 2             Lab Results   Component Value Date    TROP <0.046 08/04/2018    TROPHS <3 01/10/2024           HEART Score: 2        Risk of adverse cardiac event is 0.9-1.7%                  MDM      Differential Diagnosis  38-year-old female presents today for evaluation of a 1 week of chest pain.  Patient's lungs are  grossly clear and she has no increased work of breathing.  She is hemodynamically stable and overall well-appearing.  Patient's EKG demonstrates nonspecific changes and there is no sign of any acute ischemia based on EKG.  Within the differential would be acute coronary syndrome, pulmonary embolism, pneumonia, pneumothorax, atypical chest pain.  Plan for chest x-ray along with labs, will reassess.    9:27 pm  Patient's pain is better following Toradol.  Heart score is low risk by my calculation.  Her workup is reassuring.  With her clinical improvement and reassuring workup, I feel she is stable for discharge home.  I advised PCP follow-up in the next week for reassessment return for new or worsening symptoms.  She feels comfortable plan, will DC.    External Chart Reviewed  I reviewed the EKG that was performed at the immediate care    Independent Interpretation  I independently interpreted the x-ray, no significant consolidation noted                                   Medical Decision Making      Disposition and Plan     Clinical Impression:  1. Chest pain of uncertain etiology         Disposition:  Discharge  1/10/2024  9:29 pm    Follow-up:  Nicolasa Mirza MD  [id number]  [facility] [id number]  [phone]    Call in 1 day(s)            Medications Prescribed:  Current Discharge Medication List

## 2024-01-13 NOTE — TELEPHONE ENCOUNTER
Pt is asking if she can be seen sooner than 1/30?  You have no openings as of this message.  Overbook or keep appt?

## 2024-01-13 NOTE — TELEPHONE ENCOUNTER
From: Denise Martinez  To: SUN LEDEZMA  Sent: 1/11/2024 10:34 PM CST  Subject: Heart test    Hello, was in urgent care with pain in chest and shortness of breath. They sent me to ER, some T waves.ER is asking to see you .My appointment is on January 30, Can i be seen earlier?

## 2024-01-17 ENCOUNTER — LAB ENCOUNTER (OUTPATIENT)
Dept: LAB | Age: 39
End: 2024-01-17
Attending: FAMILY MEDICINE
Payer: COMMERCIAL

## 2024-01-17 ENCOUNTER — HOSPITAL ENCOUNTER (OUTPATIENT)
Dept: GENERAL RADIOLOGY | Age: 39
Discharge: HOME | End: 2024-01-17
Attending: FAMILY MEDICINE
Payer: COMMERCIAL

## 2024-01-17 ENCOUNTER — OFFICE VISIT (OUTPATIENT)
Dept: FAMILY MEDICINE CLINIC | Facility: CLINIC | Age: 39
End: 2024-01-17
Payer: COMMERCIAL

## 2024-01-17 VITALS
HEART RATE: 65 BPM | SYSTOLIC BLOOD PRESSURE: 100 MMHG | OXYGEN SATURATION: 96 % | RESPIRATION RATE: 15 BRPM | WEIGHT: 202 LBS | HEIGHT: 68 IN | BODY MASS INDEX: 30.62 KG/M2 | DIASTOLIC BLOOD PRESSURE: 60 MMHG | TEMPERATURE: 98 F

## 2024-01-17 DIAGNOSIS — R53.82 CHRONIC FATIGUE: ICD-10-CM

## 2024-01-17 DIAGNOSIS — G89.29 COCCYGEAL PAIN, CHRONIC: ICD-10-CM

## 2024-01-17 DIAGNOSIS — M53.3 COCCYGEAL PAIN, CHRONIC: ICD-10-CM

## 2024-01-17 DIAGNOSIS — R07.89 NON-CARDIAC CHEST PAIN: Primary | ICD-10-CM

## 2024-01-17 DIAGNOSIS — R45.89 ANXIETY ABOUT HEALTH: ICD-10-CM

## 2024-01-17 LAB
TSI SER-ACNC: 0.61 MIU/ML (ref 0.36–3.74)
VIT B12 SERPL-MCNC: 642 PG/ML (ref 193–986)
VIT D+METAB SERPL-MCNC: 21.9 NG/ML (ref 30–100)

## 2024-01-17 PROCEDURE — 3078F DIAST BP <80 MM HG: CPT | Performed by: FAMILY MEDICINE

## 2024-01-17 PROCEDURE — 82607 VITAMIN B-12: CPT

## 2024-01-17 PROCEDURE — 3074F SYST BP LT 130 MM HG: CPT | Performed by: FAMILY MEDICINE

## 2024-01-17 PROCEDURE — 84443 ASSAY THYROID STIM HORMONE: CPT

## 2024-01-17 PROCEDURE — 3008F BODY MASS INDEX DOCD: CPT | Performed by: FAMILY MEDICINE

## 2024-01-17 PROCEDURE — 72220 X-RAY EXAM SACRUM TAILBONE: CPT | Performed by: FAMILY MEDICINE

## 2024-01-17 PROCEDURE — 99214 OFFICE O/P EST MOD 30 MIN: CPT | Performed by: FAMILY MEDICINE

## 2024-01-17 PROCEDURE — 82306 VITAMIN D 25 HYDROXY: CPT

## 2024-01-17 RX ORDER — FLUOXETINE 20 MG/1
TABLET, FILM COATED ORAL
Qty: 27 TABLET | Refills: 0 | Status: SHIPPED | OUTPATIENT
Start: 2024-01-17 | End: 2024-01-18

## 2024-01-17 NOTE — PATIENT INSTRUCTIONS
Start fluoxetine (anxiety medication) (half tablet for 1st 6 days)   Then increase to whole tablet     Take diclofenac (anti-inflammatory) twice a day with food   It can increase risk of bleeding - but it is ok to take if you take with food.   If you have any bleeding (stool, nose, urine) then stop and call us       Continue vitamin D and magnesium glycinate     You can read or listen to podcast for anxiety help   The Feeling Good Handbook, by Dr. Ronaldo Yañez    Counseling is another option  [facility] recognizes the need to provide comprehensive behavioral health services to our community. That's why we established [facility]--a team of experienced psychiatrists, psychologists, physician assistants, advanced practice psychiatric nurses and counselors/therapists, who specialize in adolescent and adult behavioral health services.  There are seven [facility] offices throughout the [facility].  [facility] -- [address] [facility] ([facility] -- [address], [address], [facility] -- [address], [facility] [place] [address], [address] [place] [address], [address], [facility] -- [address], [address], [facility] -- [address], [address], [facility] -- [address] [facility] [facility], [facility]  To reach any of these locations please call [phone].

## 2024-01-17 NOTE — PROGRESS NOTES
Subjective:   Denise Martinez is a 38 year old female who presents for ER F/U (ED in [place] 1/10/24 shortess of breath and chest pain), Back Pain (X1 month), Mood Disturbance (Wants to discuss), and Foot Pain (X1 week, left foot, veins)     ER - CHEST PAIN   Seen 1 week ago   Had chest pain for about 1 week prior to going to ER. Work up essentially negative       Mood issues   Anxiety - worried about her health   She tried valerian root - not working   Having panic attacks   Sx less when her  is at home - when he is gone at work, she has more sx and panic attacks. Ruminating thoughts   Tired and exhausted       MSK pain   Back (coccygeal) pain - worse than previous. Midline   Took steroids about 2-3 weeks ago   Foot/lower leg pain (left) - states she has pain and burning of lower leg      Just started vitamin D and mag gylcinate a few days ago       History/Other:    Chief Complaint Reviewed and Verified  Nursing Notes Reviewed and   Verified  Tobacco Reviewed  Allergies Reviewed  Medications Reviewed    Problem List Reviewed  Medical History Reviewed  Surgical History   Reviewed  OB Status Reviewed  Family History Reviewed  Social History   Reviewed         Tobacco:  She currently smokes tobacco.  Social History    Tobacco Use      Smoking status: Some Days        Packs/day: 0.00        Years: 10.00        Additional pack years: 0.00        Total pack years: 0.00        Types: Cigarettes      Smokeless tobacco: Never      Tobacco comments: quit [id number]          Current Outpatient Medications   Medication Sig Dispense Refill    Magnesium Chloride (MAGNESIUM DR OR) Take by mouth.      Cholecalciferol (D3 ADULT OR) Take by mouth.      FLUoxetine HCl 20 MG Oral Tab Take 0.5 tablets (10 mg total) by mouth daily for 6 days, THEN 1 tablet (20 mg total) daily for 24 days. 27 tablet 0    diclofenac 50 MG Oral Tab EC Take 1 tablet (50 mg total) by mouth 2 (two) times daily with meals. 180 tablet 3          Review of Systems:  Review of Systems   Constitutional:  Negative for chills and fever.   HENT:  Negative for rhinorrhea and sinus pressure.    Eyes:  Negative for pain and visual disturbance.   Respiratory:  Negative for cough and shortness of breath.    Cardiovascular:  Negative for chest pain, palpitations and leg swelling.   Gastrointestinal:  Negative for abdominal pain, nausea and vomiting.   Genitourinary:  Negative for dysuria, frequency and urgency.   Musculoskeletal:  Positive for back pain. Negative for arthralgias, joint swelling and myalgias.   Skin:  Negative for pallor and rash.   Neurological:  Negative for dizziness, weakness, numbness and headaches.   Hematological:  Negative for adenopathy. Does not bruise/bleed easily.   Psychiatric/Behavioral:  Positive for sleep disturbance. The patient is nervous/anxious.        Objective:   /60   Pulse 65   Temp 98 °F (36.7 °C)   Resp 15   Ht 5' 8\" (1.727 m)   Wt 202 lb (91.6 kg)   LMP  (LMP Unknown)   SpO2 96%   BMI 30.71 kg/m²  Estimated body mass index is 30.71 kg/m² as calculated from the following:    Height as of this encounter: 5' 8\" (1.727 m).    Weight as of this encounter: 202 lb (91.6 kg).  Physical Exam  Constitutional:       Appearance: Normal appearance. She is well-developed and well-groomed.   Cardiovascular:      Rate and Rhythm: Normal rate and regular rhythm.      Pulses: Normal pulses.   Pulmonary:      Effort: Pulmonary effort is normal.      Breath sounds: Normal breath sounds.   Musculoskeletal:         General: No swelling, tenderness or deformity.      Right lower leg: No edema.      Left lower leg: No edema.      Comments: Alon's sign negative   Skin:     General: Skin is warm and dry.      Capillary Refill: Capillary refill takes less than 2 seconds.      Findings: No erythema or rash.   Neurological:      Mental Status: She is alert and oriented to person, place, and time.   Psychiatric:         Mood and  Affect: Mood and affect normal.         Behavior: Behavior is cooperative.         Assessment & Plan:   1. Non-cardiac chest pain (Primary)  2. Anxiety about health  3. Chronic fatigue  -     Vitamin D; Future; Expected date: 01/17/2024  -     Vitamin B12; Future; Expected date: 01/17/2024  -     TSH W Reflex To Free T4; Future; Expected date: 01/17/2024  4. Coccygeal pain, chronic  -     XR SACRUM + COCCYX (MIN 2 VIEWS) (CPT=72220); Future; Expected date: 01/17/2024  Other orders  -     FLUoxetine HCl; Take 0.5 tablets (10 mg total) by mouth daily for 6 days, THEN 1 tablet (20 mg total) daily for 24 days.  Dispense: 27 tablet; Refill: 0  -     Diclofenac Sodium; Take 1 tablet (50 mg total) by mouth 2 (two) times daily with meals.  Dispense: 180 tablet; Refill: 3    Anxiety is likely cause of chest pain, may also cause nerve pain in leg   Start fluoxetine   Continue vitamin supplements   Coccygeal pain present over 1 year - worsening - will start diclofenac and get XR - if no improvement in 2-3 months then get MRI  Counseling resource provided       Return in about 2 months (around 3/17/2024) for anxiety.    SUN LEDEZMA MD, 1/17/2024, 10:06 AM

## 2024-01-18 RX ORDER — FLUOXETINE 10 MG/1
CAPSULE ORAL
Qty: 54 CAPSULE | Refills: 0 | Status: SHIPPED | OUTPATIENT
Start: 2024-01-18 | End: 2024-02-17

## 2024-02-20 NOTE — TELEPHONE ENCOUNTER
Medication(s) to Refill:   Requested Prescriptions     Pending Prescriptions Disp Refills    FLUoxetine 10 MG Oral Cap [Pharmacy Med Name: FLUOXETINE 10MG CAPSULES] 60 capsule 0     Sig: Take 1 capsule (10 mg total) by mouth 2 (two) times daily.         Reason for Medication Refill being sent to Provider / Reason Protocol Failed:  [] 90 day refill has already been granted  [] Blood Pressure out of range  [] Labs Abnormal/over due  [] Medication not previously prescribed by Provider  [] Non-Protocol Medication  [] Controlled Substance   [] Due for appointment- no future appointment scheduled  [] No Follow up specified      Last Time Medication was Filled:  1/18/24      Last Office Visit with PCP: 1/17/24    When Patient was Due Back to the Office:  2 months  (from when PCP last addressed condition)    Future Appointments:  No future appointments.      Last Blood Pressures:  BP Readings from Last 2 Encounters:   01/17/24 100/60   01/10/24 102/57           Action taken:  [] Refill approved per protocol  [] Routing to provider for approval

## 2024-02-23 RX ORDER — FLUOXETINE HYDROCHLORIDE 20 MG/1
20 CAPSULE ORAL DAILY
Qty: 90 CAPSULE | Refills: 0 | Status: SHIPPED | OUTPATIENT
Start: 2024-02-23 | End: 2024-03-04

## 2024-03-04 PROCEDURE — 87081 CULTURE SCREEN ONLY: CPT | Performed by: FAMILY MEDICINE

## 2024-04-03 ENCOUNTER — APPOINTMENT (OUTPATIENT)
Dept: CT IMAGING | Age: 39
End: 2024-04-03
Attending: EMERGENCY MEDICINE

## 2024-04-03 ENCOUNTER — OFFICE VISIT (OUTPATIENT)
Dept: FAMILY MEDICINE CLINIC | Facility: CLINIC | Age: 39
End: 2024-04-03
Payer: COMMERCIAL

## 2024-04-03 ENCOUNTER — HOSPITAL ENCOUNTER (EMERGENCY)
Age: 39
Discharge: HOME | End: 2024-04-03
Attending: EMERGENCY MEDICINE

## 2024-04-03 VITALS
DIASTOLIC BLOOD PRESSURE: 63 MMHG | WEIGHT: 194 LBS | SYSTOLIC BLOOD PRESSURE: 96 MMHG | OXYGEN SATURATION: 98 % | HEART RATE: 60 BPM | TEMPERATURE: 99 F | RESPIRATION RATE: 16 BRPM | BODY MASS INDEX: 28.73 KG/M2 | HEIGHT: 68.9 IN

## 2024-04-03 VITALS
OXYGEN SATURATION: 99 % | DIASTOLIC BLOOD PRESSURE: 78 MMHG | WEIGHT: 194 LBS | SYSTOLIC BLOOD PRESSURE: 110 MMHG | BODY MASS INDEX: 29.4 KG/M2 | TEMPERATURE: 98 F | HEART RATE: 71 BPM | RESPIRATION RATE: 16 BRPM | HEIGHT: 68 IN

## 2024-04-03 DIAGNOSIS — R63.4 WEIGHT LOSS, UNINTENTIONAL: ICD-10-CM

## 2024-04-03 DIAGNOSIS — R10.84 ABDOMINAL PAIN, GENERALIZED: Primary | ICD-10-CM

## 2024-04-03 DIAGNOSIS — R10.30 LOWER ABDOMINAL PAIN: Primary | ICD-10-CM

## 2024-04-03 LAB
ALBUMIN SERPL-MCNC: 3.8 G/DL (ref 3.4–5)
ALBUMIN/GLOB SERPL: 1 {RATIO} (ref 1–2)
ALP LIVER SERPL-CCNC: 86 U/L
ALT SERPL-CCNC: 103 U/L
ANION GAP SERPL CALC-SCNC: 4 MMOL/L (ref 0–18)
AST SERPL-CCNC: 34 U/L (ref 15–37)
B-HCG UR QL: NEGATIVE
BASOPHILS # BLD AUTO: 0.03 X10(3) UL (ref 0–0.2)
BASOPHILS NFR BLD AUTO: 0.5 %
BILIRUB SERPL-MCNC: 0.5 MG/DL (ref 0.1–2)
BILIRUB UR QL STRIP.AUTO: NEGATIVE
BUN BLD-MCNC: 10 MG/DL (ref 9–23)
CALCIUM BLD-MCNC: 9.4 MG/DL (ref 8.5–10.1)
CHLORIDE SERPL-SCNC: 104 MMOL/L (ref 98–112)
CLARITY UR REFRACT.AUTO: CLEAR
CO2 SERPL-SCNC: 29 MMOL/L (ref 21–32)
COLOR UR AUTO: YELLOW
CREAT BLD-MCNC: 0.7 MG/DL
EGFRCR SERPLBLD CKD-EPI 2021: 113 ML/MIN/1.73M2 (ref 60–?)
EOSINOPHIL # BLD AUTO: 0.08 X10(3) UL (ref 0–0.7)
EOSINOPHIL NFR BLD AUTO: 1.4 %
ERYTHROCYTE [DISTWIDTH] IN BLOOD BY AUTOMATED COUNT: 13.2 %
GLOBULIN PLAS-MCNC: 3.9 G/DL (ref 2.8–4.4)
GLUCOSE BLD-MCNC: 102 MG/DL (ref 70–99)
GLUCOSE UR STRIP.AUTO-MCNC: NEGATIVE MG/DL
HCT VFR BLD AUTO: 42.8 %
HGB BLD-MCNC: 14.6 G/DL
IMM GRANULOCYTES # BLD AUTO: 0.01 X10(3) UL (ref 0–1)
IMM GRANULOCYTES NFR BLD: 0.2 %
KETONES UR STRIP.AUTO-MCNC: NEGATIVE MG/DL
LIPASE SERPL-CCNC: 31 U/L (ref 13–75)
LYMPHOCYTES # BLD AUTO: 1.86 X10(3) UL (ref 1–4)
LYMPHOCYTES NFR BLD AUTO: 32.6 %
MCH RBC QN AUTO: 30.4 PG (ref 26–34)
MCHC RBC AUTO-ENTMCNC: 34.1 G/DL (ref 31–37)
MCV RBC AUTO: 89 FL
MONOCYTES # BLD AUTO: 0.29 X10(3) UL (ref 0.1–1)
MONOCYTES NFR BLD AUTO: 5.1 %
NEUTROPHILS # BLD AUTO: 3.43 X10 (3) UL (ref 1.5–7.7)
NEUTROPHILS # BLD AUTO: 3.43 X10(3) UL (ref 1.5–7.7)
NEUTROPHILS NFR BLD AUTO: 60.2 %
NITRITE UR QL STRIP.AUTO: NEGATIVE
OSMOLALITY SERPL CALC.SUM OF ELEC: 283 MOSM/KG (ref 275–295)
PH UR STRIP.AUTO: 7.5 [PH] (ref 5–8)
PLATELET # BLD AUTO: 286 10(3)UL (ref 150–450)
POTASSIUM SERPL-SCNC: 4.1 MMOL/L (ref 3.5–5.1)
PROT SERPL-MCNC: 7.7 G/DL (ref 6.4–8.2)
PROT UR STRIP.AUTO-MCNC: NEGATIVE MG/DL
RBC # BLD AUTO: 4.81 X10(6)UL
RBC UR QL AUTO: NEGATIVE
SODIUM SERPL-SCNC: 137 MMOL/L (ref 136–145)
SP GR UR STRIP.AUTO: 1.02 (ref 1–1.03)
UROBILINOGEN UR STRIP.AUTO-MCNC: 0.2 MG/DL
WBC # BLD AUTO: 5.7 X10(3) UL (ref 4–11)

## 2024-04-03 PROCEDURE — 85025 COMPLETE CBC W/AUTO DIFF WBC: CPT | Performed by: EMERGENCY MEDICINE

## 2024-04-03 PROCEDURE — 74176 CT ABD & PELVIS W/O CONTRAST: CPT | Performed by: EMERGENCY MEDICINE

## 2024-04-03 PROCEDURE — 80053 COMPREHEN METABOLIC PANEL: CPT | Performed by: EMERGENCY MEDICINE

## 2024-04-03 PROCEDURE — 87086 URINE CULTURE/COLONY COUNT: CPT | Performed by: EMERGENCY MEDICINE

## 2024-04-03 PROCEDURE — 96375 TX/PRO/DX INJ NEW DRUG ADDON: CPT

## 2024-04-03 PROCEDURE — 99284 EMERGENCY DEPT VISIT MOD MDM: CPT

## 2024-04-03 PROCEDURE — 81001 URINALYSIS AUTO W/SCOPE: CPT | Performed by: EMERGENCY MEDICINE

## 2024-04-03 PROCEDURE — 81025 URINE PREGNANCY TEST: CPT

## 2024-04-03 PROCEDURE — 81015 MICROSCOPIC EXAM OF URINE: CPT | Performed by: EMERGENCY MEDICINE

## 2024-04-03 PROCEDURE — 99285 EMERGENCY DEPT VISIT HI MDM: CPT

## 2024-04-03 PROCEDURE — 96361 HYDRATE IV INFUSION ADD-ON: CPT

## 2024-04-03 PROCEDURE — 83690 ASSAY OF LIPASE: CPT | Performed by: EMERGENCY MEDICINE

## 2024-04-03 PROCEDURE — 96374 THER/PROPH/DIAG INJ IV PUSH: CPT

## 2024-04-03 RX ORDER — ONDANSETRON 4 MG/1
4 TABLET, ORALLY DISINTEGRATING ORAL EVERY 4 HOURS PRN
Qty: 10 TABLET | Refills: 0 | Status: SHIPPED | OUTPATIENT
Start: 2024-04-03 | End: 2024-04-10

## 2024-04-03 RX ORDER — KETOROLAC TROMETHAMINE 15 MG/ML
15 INJECTION, SOLUTION INTRAMUSCULAR; INTRAVENOUS ONCE
Status: COMPLETED | OUTPATIENT
Start: 2024-04-03 | End: 2024-04-03

## 2024-04-03 RX ORDER — DICYCLOMINE HCL 20 MG
20 TABLET ORAL 4 TIMES DAILY PRN
Qty: 30 TABLET | Refills: 0 | Status: SHIPPED | OUTPATIENT
Start: 2024-04-03 | End: 2024-05-03

## 2024-04-03 RX ORDER — ONDANSETRON 2 MG/ML
4 INJECTION INTRAMUSCULAR; INTRAVENOUS ONCE
Status: COMPLETED | OUTPATIENT
Start: 2024-04-03 | End: 2024-04-03

## 2024-04-03 NOTE — ED PROVIDER NOTES
Patient Seen in: [facility] [place]      History     Chief Complaint   Patient presents with    Abdomen/Flank Pain     Stated Complaint: Abdominal pain, weight loss, nausea - 4 days, sent from [facility]    Subjective:   HPI    Patient is a 39-year-old female presenting for evaluation of abdominal pain.  She describes it as a [place] around her abdomen, pain in both the lower abdomen, upper abdomen on both sides but not in periumbilical area.  Constant for the last 4 days.  Nauseous with it but no vomiting or diarrhea.  No fevers or chills.  No urinary symptoms.  No recent travel.  No known sick contacts.    Objective:   Past Medical History:   Diagnosis Date    Cholelithiasis without cholecystitis 08/07/2018    Chronic cystitis     Chronic pyelonephritis     Gestational diabetes (HCC)     Hyperlipidemia     Problems with swallowing     Shortness of breath               Past Surgical History:   Procedure Laterality Date    CHOLECYSTECTOMY  08/22/2018                Social History     Socioeconomic History    Marital status:    Tobacco Use    Smoking status: Some Days     Packs/day: 0.00     Years: 10.00     Additional pack years: 0.00     Total pack years: 0.00     Types: Cigarettes    Smokeless tobacco: Never    Tobacco comments:     quit [id number]   Vaping Use    Vaping Use: Some days    Substances: Nicotine   Substance and Sexual Activity    Alcohol use: Never    Drug use: Never    Sexual activity: Yes     Partners: Male     Birth control/protection: I.U.D.     Comment: mirena IUD    Other Topics Concern    Caffeine Concern Yes    Stress Concern No    Weight Concern Yes    Special Diet No    Exercise No    Seat Belt No              Review of Systems    Positive for stated complaint: Abdominal pain, weight loss, nausea - 4 days, sent from [facility]  Other systems are as noted in HPI.  Constitutional and vital signs reviewed.      All other systems reviewed and negative except as noted  above.    Physical Exam     ED Triage Vitals [04/03/24 1303]   BP 99/61   Pulse 74   Resp 16   Temp 98.9 °F (37.2 °C)   Temp src Temporal   SpO2 97 %   O2 Device None (Room air)       Current:BP 96/63   Pulse 60   Temp 98.9 °F (37.2 °C) (Temporal)   Resp 16   Ht 175 cm (5' 8.9\")   Wt 88 kg   SpO2 98%   BMI 28.73 kg/m²         Physical Exam  Vitals and nursing note reviewed.   Constitutional:       Appearance: She is well-developed.   HENT:      Head: Normocephalic and atraumatic.   Eyes:      Conjunctiva/sclera: Conjunctivae normal.      Pupils: Pupils are equal, round, and reactive to light.   Cardiovascular:      Rate and Rhythm: Normal rate and regular rhythm.      Heart sounds: Normal heart sounds.   Pulmonary:      Effort: Pulmonary effort is normal.      Breath sounds: Normal breath sounds.   Abdominal:      General: Bowel sounds are normal.      Palpations: Abdomen is soft.      Comments: Mild diffuse tenderness more pronounced in the upper and lower abdomen.  Nondistended.  No rebound or guarding.   Musculoskeletal:         General: Normal range of motion.      Cervical back: Normal range of motion and neck supple.   Skin:     General: Skin is warm and dry.   Neurological:      Mental Status: She is alert and oriented to person, place, and time.               ED Course     Labs Reviewed   COMP METABOLIC PANEL (14) - Abnormal; Notable for the following components:       Result Value    Glucose 102 (*)      (*)     All other components within normal limits   URINALYSIS WITH CULTURE REFLEX - Abnormal; Notable for the following components:    Leukocyte Esterase Urine Small (*)     All other components within normal limits   UA MICROSCOPIC ONLY, URINE - Abnormal; Notable for the following components:    RBC Urine 3-5 (*)     Bacteria Urine 1+ (*)     Squamous Epi. Cells Few (*)     All other components within normal limits   LIPASE - Normal   POCT PREGNANCY URINE - Normal   CBC WITH DIFFERENTIAL  WITH PLATELET    Narrative:     The following orders were created for panel order CBC With Differential With Platelet.  Procedure                               Abnormality         Status                     ---------                               -----------         ------                     CBC W/ DIFFERENTIAL[[id number]]                              Final result                 Please view results for these tests on the individual orders.   URINE CULTURE, ROUTINE   CBC W/ DIFFERENTIAL             CT ABDOMEN+PELVIS(CPT=74176)    Result Date: 4/3/2024  PROCEDURE:  CT ABDOMEN+PELVIS (CPT=74176)  COMPARISON:  [facility], CT, CT ABDOMEN+PELVIS(CPT=74176), 5/13/2023, 1:22 PM.  INDICATIONS:  Abdominal pain, weight loss, nausea - 4 days, sent from [facility]  TECHNIQUE:  Unenhanced multislice CT scanning was performed from the dome of the diaphragm to the pubic symphysis.  Dose reduction techniques were used. Dose information is transmitted to the ACR (American College of Radiology) NRDR (National Radiology Data Registry) which includes the Dose Index Registry.  PATIENT STATED HISTORY: (As transcribed by Technologist)  Patient complains of bilateral lower abdominal pain.    FINDINGS:  LIVER:  No enlargement, atrophy, abnormal density, or significant focal lesion.  BILIARY:  The gallbladder is surgically absent. PANCREAS:  No lesion, fluid collection, ductal dilatation, or atrophy.  SPLEEN:  No enlargement or focal lesion.  KIDNEYS:  No mass, obstruction, or calcification.  ADRENALS:  No mass or enlargement.  AORTA/VASCULAR:    Unremarkable as seen on non-contrast imaging. RETROPERITONEUM:  No mass or adenopathy.  BOWEL/MESENTERY:  No visible mass, obstruction, or bowel wall thickening.  ABDOMINAL WALL:  No mass or hernia.  URINARY BLADDER:  No visible focal wall thickening, lesion, or calculus.  PELVIC NODES:  No adenopathy.  PELVIC ORGANS:  Incidental right adnexal cyst is noted measuring up to 3.8 x 2.1 cm which is likely  physiologic.  Previously seen left adnexal cyst is no longer identified.  IUD is in place. BONES:  No bony lesion or fracture.  LUNG BASES:  No visible pulmonary or pleural disease.  OTHER:  Negative.             CONCLUSION:  1. No acute abnormality in the abdomen and pelvis.  Incidental simple right adnexal cyst is likely physiologic.   LOCATION:  [facility]   Dictated by (CST): Speedy Wade MD on 4/03/2024 at 2:44 PM     Finalized by (CST): Speedy Wade MD on 4/03/2024 at 2:51 PM               MDM      39-year-old female presenting for evaluation of 4 days of constant abdominal pain.  More in the lower and upper abdomen as well as either side but not in periumbilical area.  Constant, not worse with eating and drinking.  Nauseous but no vomiting or diarrhea, no fevers or chills.  Differential includes gastroenteritis, food poisoning, colitis, diverticulitis, UTI.  Vitals are normal and overall exam is benign, not ill-appearing.  Labs ordered we will check a CT to evaluate for surgical pathology.      Update at 3:30 PM.  Labs unremarkable with no leukocytosis.  Normal LFTs, no pancreatitis.  CT as above without acute intra-abdominal pathology.  Discussed results.  Pain was relieved after Toradol though it is starting to come back now.  She declines anything for it here.  Overall consistent with viral gastroenteritis with pain and nausea.  No indication for admission.        Past Medical History-high cholesterol    Differential diagnosis before testing included appendicitis, gastroenteritis, colitis, diverticulitis    Co-morbidities that add to the complexity of management include: None    Testing ordered during this visit included labs, UA, CT    Radiographic images  I personally reviewed the radiographs and my individual interpretation shows no free air, free fluid or acute intra-abdominal inflammatory process  I also reviewed the official reports that showed no free air, free fluid or acute intra-abdominal  inflammatory process        Medications Provided: Toradol, Zofran            Disposition:          Discharge  I have discussed with the patient the results of test, differential diagnosis, treatment plan, warning signs and symptoms which should prompt immediate return.  They expressed understanding of these instructions and agrees to the following plan provided.  They were given written discharge instructions and agrees to return for any concerns and voiced understanding and all questions were answered.                           Medical Decision Making      Disposition and Plan     Clinical Impression:  1. Abdominal pain, generalized         Disposition:  Discharge  4/3/2024  3:32 pm    Follow-up:  Nicolasa Mirza MD  [id number]  [facility] [address]  [phone]    Follow up            Medications Prescribed:  Current Discharge Medication List        START taking these medications    Details   dicyclomine 20 MG Oral Tab Take 1 tablet (20 mg total) by mouth 4 (four) times daily as needed.  Qty: 30 tablet, Refills: 0      ondansetron 4 MG Oral Tablet Dispersible Take 1 tablet (4 mg total) by mouth every 4 (four) hours as needed for Nausea.  Qty: 10 tablet, Refills: 0

## 2024-04-03 NOTE — PROGRESS NOTES
CHIEF COMPLAINT:     Chief Complaint   Patient presents with    Abdominal Pain     Lower abd pain for 4 days.  With nausea.  No otc med taken  pt has not eaten today due to pain, otherwise, no change in eating habits.  Stool softer today that normal, no diarrhea also low back pain.           HPI:   Denise Martinez is a 39 year old female who presents for complaints of abdominal pain.  Symptoms have been present for 4  days. Pain is located in the LUQ and lower abdomen. Also has pain in the low back/ coccyx area. Pain is described as aching, sharp. Severity is  7/10 . Reports last BM was this morning and was normal. Reports she has noted a 8 lb weight loss over the past week according to her home scale, this has been unintentional. Per chart, weight was 301lb 2/4 and today is 194lb.  States today has not eaten due to pain, but yesterday had normal appetite.  Denies vomiting or fever.    Current Outpatient Medications   Medication Sig Dispense Refill    FLUoxetine 20 MG Oral Cap Take 2 capsules (40 mg total) by mouth daily.      Magnesium Chloride (MAGNESIUM DR OR) Take by mouth.      Cholecalciferol (D3 ADULT OR) Take by mouth.        Past Medical History:   Diagnosis Date    Cholelithiasis without cholecystitis 08/07/2018    Chronic cystitis     Chronic pyelonephritis     Gestational diabetes (HCC)     Hyperlipidemia     Problems with swallowing     Shortness of breath       Social History:  Social History     Socioeconomic History    Marital status:    Tobacco Use    Smoking status: Some Days     Packs/day: 0.00     Years: 10.00     Additional pack years: 0.00     Total pack years: 0.00     Types: Cigarettes    Smokeless tobacco: Never    Tobacco comments:     quit [id number]   Vaping Use    Vaping Use: Some days    Substances: Nicotine   Substance and Sexual Activity    Alcohol use: Never    Drug use: Never    Sexual activity: Yes     Partners: Male     Birth control/protection: I.U.D.     Comment: mirena IUD     Other Topics Concern    Caffeine Concern Yes    Stress Concern No    Weight Concern Yes    Special Diet No    Exercise No    Seat Belt No        REVIEW OF SYSTEMS:   GENERAL HEALTH: feels well otherwise. No fever, chills, or malaise.   SKIN: denies any unusual skin lesions or rashes  HEENT: denies ear pain, congestion, or sore throat  CARDIOVASCULAR: denies chest pain or palpitations  RESPIRATORY: denies shortness of breath, cough or wheezing  GI:See HPI  NEURO: denies headaches, loss of bowel or bladder control    EXAM:   /78   Pulse 71   Temp 98.3 °F (36.8 °C) (Oral)   Resp 16   Ht 5' 8\" (1.727 m)   Wt 194 lb (88 kg)   SpO2 99%   BMI 29.50 kg/m²   GENERAL: well developed, well nourished,in no apparent distress  SKIN: no rashes,no suspicious lesions  HEAD: atraumatic, normocephalic,   THROAT: oral mucosa pink, moist. Posterior pharynx is not erythematous. No exudates.  NECK: supple,no adenopathy  LUNGS: clear to auscultation bilaterally. No wheezing, rales, or rhonchi.    CARDIO: RRR without murmur  GI: No visible scars or masses. BS's present x4.  No palpable masses or hepatosplenomegaly.  + tenderness upon palpation in LLQ and RLQ. No rebound tenderness or guarding.       ASSESSMENT AND PLAN:     ASSESSMENT:  Denise was seen today for abdominal pain.    Diagnoses and all orders for this visit:    Lower abdominal pain    Weight loss, unintentional        PLAN:  Discussed limitations of [facility]. Recommended higher level of care for further evaluation.   Pt states she will go to [facility]. Placed on ER tracking board.

## 2024-04-03 NOTE — DISCHARGE INSTRUCTIONS
Labs and CT scan here are normal.  Take dicyclomine as needed for cramping, ondansetron for nausea.  Regular diet as tolerated.

## 2024-04-03 NOTE — ED INITIAL ASSESSMENT (HPI)
Pt reports lower and upper but not mid abd pain x 4 day with constant pain, nausea no vomiting. Pt denies fever, diarrhea or urinary sxs.

## 2024-04-11 RX ORDER — FLUOXETINE HYDROCHLORIDE 20 MG/1
40 CAPSULE ORAL DAILY
Qty: 60 CAPSULE | Refills: 0 | Status: SHIPPED | OUTPATIENT
Start: 2024-04-11

## 2024-04-19 RX ORDER — FLUOXETINE HYDROCHLORIDE 20 MG/1
40 CAPSULE ORAL DAILY
Qty: 60 CAPSULE | Refills: 0 | OUTPATIENT
Start: 2024-04-19

## 2024-05-14 RX ORDER — FLUOXETINE HYDROCHLORIDE 20 MG/1
40 CAPSULE ORAL DAILY
Qty: 60 CAPSULE | Refills: 2 | Status: SHIPPED | OUTPATIENT
Start: 2024-05-14

## 2024-05-22 ENCOUNTER — OFFICE VISIT (OUTPATIENT)
Dept: FAMILY MEDICINE CLINIC | Facility: CLINIC | Age: 39
End: 2024-05-22

## 2024-05-22 VITALS
HEIGHT: 68 IN | OXYGEN SATURATION: 98 % | WEIGHT: 201 LBS | RESPIRATION RATE: 20 BRPM | HEART RATE: 72 BPM | SYSTOLIC BLOOD PRESSURE: 90 MMHG | BODY MASS INDEX: 30.46 KG/M2 | DIASTOLIC BLOOD PRESSURE: 60 MMHG

## 2024-05-22 DIAGNOSIS — E66.09 CLASS 1 OBESITY DUE TO EXCESS CALORIES WITH SERIOUS COMORBIDITY AND BODY MASS INDEX (BMI) OF 30.0 TO 30.9 IN ADULT: ICD-10-CM

## 2024-05-22 DIAGNOSIS — Z00.00 ROUTINE PHYSICAL EXAMINATION: Primary | ICD-10-CM

## 2024-05-22 DIAGNOSIS — E78.00 PURE HYPERCHOLESTEROLEMIA: ICD-10-CM

## 2024-05-22 DIAGNOSIS — F41.9 ANXIETY: ICD-10-CM

## 2024-05-22 DIAGNOSIS — L91.8 CUTANEOUS SKIN TAGS: ICD-10-CM

## 2024-05-22 PROCEDURE — 99395 PREV VISIT EST AGE 18-39: CPT | Performed by: FAMILY MEDICINE

## 2024-05-22 PROCEDURE — 3074F SYST BP LT 130 MM HG: CPT | Performed by: FAMILY MEDICINE

## 2024-05-22 PROCEDURE — 3078F DIAST BP <80 MM HG: CPT | Performed by: FAMILY MEDICINE

## 2024-05-22 PROCEDURE — 3008F BODY MASS INDEX DOCD: CPT | Performed by: FAMILY MEDICINE

## 2024-05-22 RX ORDER — PHENTERMINE HYDROCHLORIDE 37.5 MG/1
37.5 TABLET ORAL
Qty: 30 TABLET | Refills: 2 | Status: SHIPPED | OUTPATIENT
Start: 2024-05-22

## 2024-05-22 NOTE — PROGRESS NOTES
Denise Martinez is a 39 year old female.    CC:    Chief Complaint   Patient presents with    Well Adult       HPI:  Wellness     Exercise: Yes  Drinks water: Yes  Eat variety of fruits & vegetables, lean meats: Yes  Issues with sleep: No  Regular dental exams: Yes  Change in size/consistency of stool: No  Change in appearance of mole: No    Gyne Hx  OB History    Para Term  AB Living   4 4 2 2 0 3   SAB IAB Ectopic Multiple Live Births   0 0 0 0 3     No LMP recorded. (Menstrual status: IUD - Intrauterine Device).  Sees gyn  Denies breast pain or lump     CAGE Alcohol Screening       2024     1:57 PM   CAGE Flowsheet   Have you ever felt you should Cut down on your drinking? 0   Have people Annoyed you by criticizing your drinking? 0   Have you ever felt bad or Guilty about your drinking? 0   Have you ever had a drink first thing in the morning to steady your nerves or to get rid of a hangover (Eye opener)? 0   Total Score: Item responses on the CAGE are scored 0 or 1, with a higher score an indication of alcohol 0   Total Score: Item responses on the CAGE are scored 0 or 1, with a higher score an indication of alcohol No Risk        Depression Screening (PHQ-2/PHQ-9): Over the LAST 2 WEEKS   Little interest or pleasure in doing things: Not at all    Feeling down, depressed, or hopeless: Not at all    PHQ-2 SCORE: 0          [place] Suicide Severity Rating Scale Screener   In what setting is the screener performed?: an office visit  1. Have you wished you were dead or wished you could go to sleep and not wake up? (past 30 days): No  2. Have you actually had any thoughts of killing yourself? (past 30 days): No  6. Have you ever done anything, started to do anything, or prepared to do anything to end your life? (lifetime): No  Score and Suggested Actions - Office Visit: No Risk  Score and Intervention  Score and Suggested Actions - Office Visit: No Risk    Also wants to discuss:     Weight   Admits  to emotional eating   Feels mood is better     Follow up     Anxiety - improved with fluoxetine   Wondering if she can decrease       Allergies:  Allergies   Allergen Reactions    Radiology Contrast Iodinated Dyes OTHER (SEE COMMENTS)     syncope      Current Meds:  Current Outpatient Medications on File Prior to Visit   Medication Sig Dispense Refill    FLUoxetine 20 MG Oral Cap Take 2 capsules (40 mg total) by mouth daily. 60 capsule 2     No current facility-administered medications on file prior to visit.        History:  Past Medical History:    Cholelithiasis without cholecystitis    Chronic cystitis    Chronic pyelonephritis    Gestational diabetes (HCC)    Hyperlipidemia    Problems with swallowing    Shortness of breath      Past Surgical History:   Procedure Laterality Date    Cholecystectomy  08/22/2018      History reviewed. No pertinent family history.   Family Status   Relation Status    Fa Alive    Mo Alive      Social History     Socioeconomic History    Marital status:    Tobacco Use    Smoking status: Some Days     Current packs/day: 0.00     Types: Cigarettes    Smokeless tobacco: Never    Tobacco comments:     quit [id number]   Vaping Use    Vaping status: Some Days    Substances: Nicotine   Substance and Sexual Activity    Alcohol use: Never    Drug use: Never    Sexual activity: Yes     Partners: Male     Birth control/protection: I.U.D.     Comment: mirena IUD    Other Topics Concern    Caffeine Concern Yes    Stress Concern No    Weight Concern Yes    Special Diet No    Exercise No    Seat Belt No              There is no immunization history on file for this patient.    Wt Readings from Last 6 Encounters:   05/22/24 201 lb (91.2 kg)   04/03/24 194 lb (88 kg)   04/03/24 194 lb (88 kg)   03/04/24 201 lb (91.2 kg)   01/17/24 202 lb (91.6 kg)   01/10/24 200 lb (90.7 kg)       BP Readings from Last 3 Encounters:   05/22/24 90/60   04/03/24 96/63   04/03/24 110/78       REVIEW OF SYSTEMS:    Review of Systems   Constitutional:  Negative for chills, fever and malaise/fatigue.   HENT:  Negative for congestion, ear pain and sore throat.    Eyes:  Negative for blurred vision, double vision and pain.   Respiratory:  Negative for cough, shortness of breath and wheezing.    Cardiovascular:  Negative for chest pain, palpitations and leg swelling.   Gastrointestinal:  Negative for abdominal pain, blood in stool, constipation, diarrhea, melena, nausea and vomiting.   Genitourinary:  Negative for dysuria, flank pain and frequency.   Musculoskeletal:  Negative for back pain, joint pain and myalgias.   Skin:  Negative for itching and rash.   Neurological:  Negative for dizziness, weakness and headaches.   Endo/Heme/Allergies:  Negative for environmental allergies and polydipsia. Does not bruise/bleed easily.   Psychiatric/Behavioral:  Negative for depression. The patient is not nervous/anxious and does not have insomnia.        EXAM:   BP 90/60   Pulse 72   Resp 20   Ht 5' 8\" (1.727 m)   Wt 201 lb (91.2 kg)   SpO2 98%   BMI 30.56 kg/m²   Body mass index is 30.56 kg/m².  No LMP recorded. (Menstrual status: IUD - Intrauterine Device).    Physical Exam  Constitutional:       General: She is not in acute distress.     Appearance: Normal appearance.   HENT:      Right Ear: Tympanic membrane normal.      Left Ear: Tympanic membrane normal.      Mouth/Throat:      Mouth: Mucous membranes are moist.      Pharynx: Oropharynx is clear. No posterior oropharyngeal erythema.   Eyes:      Extraocular Movements: Extraocular movements intact.      Conjunctiva/sclera: Conjunctivae normal.      Pupils: Pupils are equal, round, and reactive to light.   Cardiovascular:      Rate and Rhythm: Normal rate and regular rhythm.      Pulses: Normal pulses.      Heart sounds: Normal heart sounds.   Pulmonary:      Effort: Pulmonary effort is normal.      Breath sounds: Normal breath sounds. No wheezing or rhonchi.   Chest:   Breasts:      Breasts are symmetrical.      Right: No mass, nipple discharge, skin change or tenderness.      Left: No mass, nipple discharge, skin change or tenderness.   Abdominal:      General: Abdomen is flat. Bowel sounds are normal.      Palpations: Abdomen is soft.      Tenderness: There is no abdominal tenderness. There is no guarding.   Musculoskeletal:         General: No swelling, tenderness, deformity or signs of injury.      Cervical back: Neck supple.   Lymphadenopathy:      Cervical: No cervical adenopathy.      Upper Body:      Right upper body: No supraclavicular or axillary adenopathy.      Left upper body: No supraclavicular or axillary adenopathy.   Skin:     General: Skin is warm and dry.      Findings: No rash.   Neurological:      General: No focal deficit present.      Mental Status: She is alert and oriented to person, place, and time.      Motor: No weakness.   Psychiatric:         Mood and Affect: Mood normal.         Behavior: Behavior normal.         Thought Content: Thought content normal.                ASSESSMENT/PLAN:      1. Routine physical examination    2. Class 1 obesity due to excess calories with serious comorbidity and body mass index (BMI) of 30.0 to 30.9 in adult  - Phentermine HCl 37.5 MG Oral Tab; Take 1 tablet (37.5 mg total) by mouth every morning before breakfast.  Dispense: 30 tablet; Refill: 2      Trial of phentermine     3. Anxiety  If able to wean down fluoxetine in 2-3 mo ok to do so     4. Pure hypercholesterolemia  Diet modification, weight loss     5. Cutaneous skin tags  Patient desires removal of skin tags of upper eyelid         Health Maintenance   Topic Date Due    DTaP,Tdap,and Td Vaccines (1 - Tdap) Never done    COVID-19 Vaccine (1 - 2023-24 season) Never done    Annual Physical  05/26/2024    Influenza Vaccine (Season Ended) 11/14/2024 (Originally 10/1/2024)    Pap Smear  03/07/2026    Annual Depression Screening  Completed    Tobacco Cessation Counseling   Completed    Pneumococcal Vaccine: Birth to 64yrs  Aged Out         Healthy diet and regular exercise discussed     Meds & Refills for this Visit:  Requested Prescriptions     Signed Prescriptions Disp Refills    Phentermine HCl 37.5 MG Oral Tab 30 tablet 2     Sig: Take 1 tablet (37.5 mg total) by mouth every morning before breakfast.         Imaging & Consults:  None    Return in about 6 months (around 11/22/2024) for weight loss.  Skin tag removal eyes - small

## 2024-05-22 NOTE — PATIENT INSTRUCTIONS
Please follow a heart healthy diet including lean meats (like chicken breast, turkey breast) or fish, fresh fruits and vegetables and limit/avoid processed foods and fast foods. Exercise regularly as well.

## 2024-07-22 RX ORDER — FLUOXETINE HYDROCHLORIDE 20 MG/1
40 CAPSULE ORAL DAILY
Qty: 180 CAPSULE | Refills: 0 | Status: SHIPPED | OUTPATIENT
Start: 2024-07-22

## 2024-08-12 ENCOUNTER — APPOINTMENT (OUTPATIENT)
Dept: CT IMAGING | Age: 39
End: 2024-08-12
Attending: EMERGENCY MEDICINE
Payer: COMMERCIAL

## 2024-08-12 ENCOUNTER — HOSPITAL ENCOUNTER (EMERGENCY)
Age: 39
Discharge: HOME | End: 2024-08-12
Attending: EMERGENCY MEDICINE
Payer: COMMERCIAL

## 2024-08-12 VITALS
OXYGEN SATURATION: 99 % | DIASTOLIC BLOOD PRESSURE: 57 MMHG | TEMPERATURE: 98 F | SYSTOLIC BLOOD PRESSURE: 90 MMHG | HEART RATE: 61 BPM | RESPIRATION RATE: 15 BRPM | BODY MASS INDEX: 32 KG/M2 | WEIGHT: 210 LBS

## 2024-08-12 DIAGNOSIS — N83.202 LEFT OVARIAN CYST: ICD-10-CM

## 2024-08-12 DIAGNOSIS — R10.9 ABDOMINAL PAIN OF UNKNOWN ETIOLOGY: Primary | ICD-10-CM

## 2024-08-12 LAB
ALBUMIN SERPL-MCNC: 3.8 G/DL (ref 3.4–5)
ALBUMIN/GLOB SERPL: 0.9 {RATIO} (ref 1–2)
ALP LIVER SERPL-CCNC: 88 U/L
ALT SERPL-CCNC: 81 U/L
ANION GAP SERPL CALC-SCNC: 5 MMOL/L (ref 0–18)
AST SERPL-CCNC: 43 U/L (ref 15–37)
B-HCG UR QL: NEGATIVE
BASOPHILS # BLD AUTO: 0.04 X10(3) UL (ref 0–0.2)
BASOPHILS NFR BLD AUTO: 0.4 %
BILIRUB SERPL-MCNC: 0.3 MG/DL (ref 0.1–2)
BILIRUB UR QL STRIP.AUTO: NEGATIVE
BUN BLD-MCNC: 10 MG/DL (ref 9–23)
CALCIUM BLD-MCNC: 9.4 MG/DL (ref 8.5–10.1)
CHLORIDE SERPL-SCNC: 105 MMOL/L (ref 98–112)
CLARITY UR REFRACT.AUTO: CLEAR
CO2 SERPL-SCNC: 26 MMOL/L (ref 21–32)
COLOR UR AUTO: YELLOW
CREAT BLD-MCNC: 0.68 MG/DL
EGFRCR SERPLBLD CKD-EPI 2021: 114 ML/MIN/1.73M2 (ref 60–?)
EOSINOPHIL # BLD AUTO: 0.05 X10(3) UL (ref 0–0.7)
EOSINOPHIL NFR BLD AUTO: 0.6 %
ERYTHROCYTE [DISTWIDTH] IN BLOOD BY AUTOMATED COUNT: 12.9 %
GLOBULIN PLAS-MCNC: 4.1 G/DL (ref 2.8–4.4)
GLUCOSE BLD-MCNC: 112 MG/DL (ref 70–99)
GLUCOSE UR STRIP.AUTO-MCNC: NEGATIVE MG/DL
HCT VFR BLD AUTO: 42.8 %
HGB BLD-MCNC: 14.9 G/DL
IMM GRANULOCYTES # BLD AUTO: 0.03 X10(3) UL (ref 0–1)
IMM GRANULOCYTES NFR BLD: 0.3 %
KETONES UR STRIP.AUTO-MCNC: NEGATIVE MG/DL
LIPASE SERPL-CCNC: 42 U/L (ref 12–53)
LYMPHOCYTES # BLD AUTO: 2.24 X10(3) UL (ref 1–4)
LYMPHOCYTES NFR BLD AUTO: 24.8 %
MCH RBC QN AUTO: 31.2 PG (ref 26–34)
MCHC RBC AUTO-ENTMCNC: 34.8 G/DL (ref 31–37)
MCV RBC AUTO: 89.5 FL
MONOCYTES # BLD AUTO: 0.44 X10(3) UL (ref 0.1–1)
MONOCYTES NFR BLD AUTO: 4.9 %
NEUTROPHILS # BLD AUTO: 6.24 X10 (3) UL (ref 1.5–7.7)
NEUTROPHILS # BLD AUTO: 6.24 X10(3) UL (ref 1.5–7.7)
NEUTROPHILS NFR BLD AUTO: 69 %
NITRITE UR QL STRIP.AUTO: NEGATIVE
OSMOLALITY SERPL CALC.SUM OF ELEC: 282 MOSM/KG (ref 275–295)
PH UR STRIP.AUTO: 5.5 [PH] (ref 5–8)
PLATELET # BLD AUTO: 381 10(3)UL (ref 150–450)
POTASSIUM SERPL-SCNC: 3.9 MMOL/L (ref 3.5–5.1)
PROT SERPL-MCNC: 7.9 G/DL (ref 6.4–8.2)
PROT UR STRIP.AUTO-MCNC: NEGATIVE MG/DL
RBC # BLD AUTO: 4.78 X10(6)UL
RBC UR QL AUTO: NEGATIVE
SODIUM SERPL-SCNC: 136 MMOL/L (ref 136–145)
SP GR UR STRIP.AUTO: 1.02 (ref 1–1.03)
UROBILINOGEN UR STRIP.AUTO-MCNC: 0.2 MG/DL
WBC # BLD AUTO: 9 X10(3) UL (ref 4–11)

## 2024-08-12 PROCEDURE — 85025 COMPLETE CBC W/AUTO DIFF WBC: CPT | Performed by: EMERGENCY MEDICINE

## 2024-08-12 PROCEDURE — 74176 CT ABD & PELVIS W/O CONTRAST: CPT | Performed by: EMERGENCY MEDICINE

## 2024-08-12 PROCEDURE — 80053 COMPREHEN METABOLIC PANEL: CPT | Performed by: EMERGENCY MEDICINE

## 2024-08-12 PROCEDURE — 81001 URINALYSIS AUTO W/SCOPE: CPT | Performed by: EMERGENCY MEDICINE

## 2024-08-12 PROCEDURE — 99284 EMERGENCY DEPT VISIT MOD MDM: CPT

## 2024-08-12 PROCEDURE — 99285 EMERGENCY DEPT VISIT HI MDM: CPT

## 2024-08-12 PROCEDURE — 96361 HYDRATE IV INFUSION ADD-ON: CPT

## 2024-08-12 PROCEDURE — 83690 ASSAY OF LIPASE: CPT | Performed by: EMERGENCY MEDICINE

## 2024-08-12 PROCEDURE — 96374 THER/PROPH/DIAG INJ IV PUSH: CPT

## 2024-08-12 PROCEDURE — 81025 URINE PREGNANCY TEST: CPT

## 2024-08-12 PROCEDURE — 81015 MICROSCOPIC EXAM OF URINE: CPT | Performed by: EMERGENCY MEDICINE

## 2024-08-12 RX ORDER — KETOROLAC TROMETHAMINE 15 MG/ML
15 INJECTION, SOLUTION INTRAMUSCULAR; INTRAVENOUS ONCE
Status: COMPLETED | OUTPATIENT
Start: 2024-08-12 | End: 2024-08-12

## 2024-08-12 NOTE — ED PROVIDER NOTES
Patient Seen in: [facility] [place]      History     Chief Complaint   Patient presents with    Abdomen/Flank Pain    Back Pain     Stated Complaint: sent by immediate care; reports lower abdominal and lower back pain for 10 days    Subjective:   HPI    39-year-old female comes to the hospital having pain in her abdomen for the past 10 days.  It is in the suprapubic region that radiates towards her back.  The pain is more on the left lower quadrant region as well.  Nothing specific makes it better or worse.  She denies any headache symptoms persist no chest pain or troubles breathing or she has any fevers or chills.  She rates pain about a 7 out of 10 its particular time.  No nausea, vomiting or diarrhea.  No dysuria.  There are no other complaints at this time.    Objective:   Past Medical History:    Cholelithiasis without cholecystitis    Chronic cystitis    Chronic pyelonephritis    Gestational diabetes (HCC)    Hyperlipidemia    Problems with swallowing    Shortness of breath              Past Surgical History:   Procedure Laterality Date    Cholecystectomy  08/22/2018                Social History     Socioeconomic History    Marital status:    Tobacco Use    Smoking status: Some Days     Current packs/day: 0.00     Types: Cigarettes    Smokeless tobacco: Never    Tobacco comments:     quit [id number]   Vaping Use    Vaping status: Some Days    Substances: Nicotine   Substance and Sexual Activity    Alcohol use: Never    Drug use: Never    Sexual activity: Yes     Partners: Male     Birth control/protection: I.U.D.     Comment: mirena IUD    Other Topics Concern    Caffeine Concern Yes    Stress Concern No    Weight Concern Yes    Special Diet No    Exercise No    Seat Belt No              Review of Systems    Positive for stated Chief Complaint: Abdomen/Flank Pain and Back Pain    Other systems are as noted in HPI.  Constitutional and vital signs reviewed.      All other systems  reviewed and negative except as noted above.    Physical Exam     ED Triage Vitals [08/12/24 1553]   /70   Pulse 71   Resp 16   Temp 98.2 °F (36.8 °C)   Temp src Temporal   SpO2 96 %   O2 Device None (Room air)       Current Vitals:   Vital Signs  BP: 90/57  Pulse: 61  Resp: 15  Temp: 98.2 °F (36.8 °C)  Temp src: Temporal    Oxygen Therapy  SpO2: 99 %  O2 Device: None (Room air)            Physical Exam    HEENT : NCAT, EOMI, PEERL,  neck supple, no JVD, trachea midline, No LAD  Heart: S1S2 normal. No murmurs, regular rate and rhythm  Lungs: Clear to auscultation bilaterally  Abdomen: Soft suprapubic and left lower quadrant regions are tender nondistended normal active bowel sounds without rebound, guarding or masses noted  Back nontender without CVA tenderness  Extremity no clubbing, cyanosis or edema noted.  Full range of motion noted without tenderness  Neuro: No focal deficits noted    All measures to prevent infection transmission during my interaction with the patient were taken.  The patient was already wearing droplet mask on my arrival to the room.  Personal protective equipment including a droplet mask as well as gloves were worn throughout the duration of my exam.  Hand washing was performed prior to and after the exam.  Stethoscope and equipment used during my examination was cleaned with a super Sani cloth germicidal wipe following the exam.    ED Course     Labs Reviewed   COMP METABOLIC PANEL (14) - Abnormal; Notable for the following components:       Result Value    Glucose 112 (*)     AST 43 (*)     ALT 81 (*)     A/G Ratio 0.9 (*)     All other components within normal limits   URINALYSIS, ROUTINE - Abnormal; Notable for the following components:    Leukocyte Esterase Urine Trace (*)     All other components within normal limits   UA MICROSCOPIC ONLY, URINE - Abnormal; Notable for the following components:    Bacteria Urine Rare (*)     Squamous Epi. Cells Few (*)     All other components  within normal limits   LIPASE - Normal   POCT PREGNANCY URINE - Normal   CBC WITH DIFFERENTIAL WITH PLATELET          ED Course as of 08/12/24 1904  ------------------------------------------------------------  Time: 08/12 1901  Comment: While here the patient had a urinalysis showing no sign of infection.  Her CMP was unremarkable.  Pregnancy test was negative.  She had a normal CBC.  She had a CT of the abdomen pelvis that I interpreted right side no other acute pathology.  She does have a left ovarian cyst that is present and according to radiology appears to be smaller  been prior.  Patient was given IV fluid and Toradol at this time.     CT ABDOMEN+PELVIS(CPT=74176)    Result Date: 8/12/2024  PROCEDURE:  CT ABDOMEN+PELVIS (CPT=74176)  COMPARISON:  [facility], CT, CT ABDOMEN+PELVIS(CPT=74176), 4/03/2024, 2:24 PM.  INDICATIONS:  sent by immediate care; reports lower abdominal and lower back pain for 10 days  TECHNIQUE:  Unenhanced multislice CT scanning was performed from the dome of the diaphragm to the pubic symphysis.  Dose reduction techniques were used. Dose information is transmitted to the ACR (American College of Radiology) NRDR (National Radiology Data Registry) which includes the Dose Index Registry.  PATIENT STATED HISTORY: (As transcribed by Technologist)   lower abdominal and lower back pain for 10 days.    FINDINGS:  LIVER:  No enlargement, atrophy, abnormal density, or significant focal lesion.  BILIARY:  There has been a previous cholecystectomy.  There is no significant bile duct distension. PANCREAS:  No lesion, fluid collection, ductal dilatation, or atrophy.  SPLEEN:  No enlargement or focal lesion.  KIDNEYS:  No mass, obstruction, or calcification.  ADRENALS:  No mass or enlargement.  AORTA/VASCULAR:    Unremarkable as seen on non-contrast imaging. RETROPERITONEUM:  No mass or adenopathy.  BOWEL/MESENTERY:  No visible mass, obstruction, or bowel wall thickening.  ABDOMINAL WALL:  No mass or  hernia.  URINARY BLADDER:  No visible focal wall thickening, lesion, or calculus.  PELVIC NODES:  No adenopathy.  PELVIC ORGANS:  A 3.4 cm right adnexal cyst is noted.  This is slightly decreased in size since prior study and most likely a physiologic finding.  Intrauterine contraceptive device is noted. BONES:  No bony lesion or fracture.  LUNG BASES:  Trace right pleural effusion is noted.  There is dependent atelectasis in the lower lobes. OTHER:  Negative.             CONCLUSION:  1. 3.4 cm right adnexal cyst was present on prior study and appears slightly decreased in size.  This is most likely a physiologic finding. 2. Trace right pleural effusion is noted. 3. There is otherwise no acute abnormality detected on this noncontrast CT of the abdomen and pelvis.    LOCATION:  [facility]   Dictated by (CST): Tristin Wilkerson MD on 8/12/2024 at 6:48 PM     Finalized by (CST): Tristin Wilkerson MD on 8/12/2024 at 6:52 PM        Medications   ketorolac (Toradol) 15 MG/ML injection 15 mg (15 mg Intravenous Given 8/12/24 1703)   sodium chloride 0.9 % IV bolus 1,000 mL (0 mL Intravenous Stopped 8/12/24 1844)              MDM      Differential diagnosis included acute diverticulitis, UTI, constipation, ruptured ovarian cyst not limited such.  The patient here has abdominal pain and does have an ovarian cyst that is chronic but smaller in size at this particular time.  No signs of another pathology noted at this time.  Patient is given Toradol will be discharged home follow-up with physician, GI and GYN for further outpatient management care.  Patient was screened and evaluated during this visit.   As a treating physician attending to the patient, I determined, within reasonable clinical confidence and prior to discharge, that an emergency medical condition was not or was no longer present.  There was no indication for further evaluation, treatment or admission on an emergency basis.       The usual and customary discharge instuctions  were discussed given the patient's ER course.  We discussed signs and symptoms that should prompt the patient's immediate return to the emergency department.   Reasonable over the counter and prescription treatment options and Physician follow up plan was discussed.       The patient is discharged in good condition.   This note was prepared using Dragon Medical voice recognition dictation software.  As a result errors may occur.  When identified to these areas have been corrected.  While every attempt is made to correct errors during dictation discrepancies may still exist.  Please contact if there are any errors.                                             Medical Decision Making      Disposition and Plan     Clinical Impression:  1. Abdominal pain of unknown etiology    2. Left ovarian cyst         Disposition:  Discharge  8/12/2024  7:04 pm    Follow-up:  Nicolasa Mirza MD  [address]  [facility] [id number] [phone]    Schedule an appointment as soon as possible for a visit in 2 day(s)      Sheridan Velasquez, DO  [address] [facility], [facility] [address]  [facility] [id number] [phone]    Schedule an appointment as soon as possible for a visit in 2 day(s)      Amauri Payton MD  [address] [place] [address], [address]  [facility] [id number] [phone]    Schedule an appointment as soon as possible for a visit in 2 day(s)            Medications Prescribed:  Current Discharge Medication List

## 2024-09-09 ENCOUNTER — TELEPHONE (OUTPATIENT)
Dept: FAMILY MEDICINE CLINIC | Facility: CLINIC | Age: 39
End: 2024-09-09

## 2024-09-09 ENCOUNTER — HOSPITAL ENCOUNTER (OUTPATIENT)
Dept: GENERAL RADIOLOGY | Age: 39
Discharge: HOME | End: 2024-09-09
Attending: FAMILY MEDICINE
Payer: COMMERCIAL

## 2024-09-09 ENCOUNTER — OFFICE VISIT (OUTPATIENT)
Dept: FAMILY MEDICINE CLINIC | Facility: CLINIC | Age: 39
End: 2024-09-09
Payer: COMMERCIAL

## 2024-09-09 VITALS
HEIGHT: 68 IN | HEART RATE: 70 BPM | SYSTOLIC BLOOD PRESSURE: 100 MMHG | OXYGEN SATURATION: 97 % | RESPIRATION RATE: 18 BRPM | BODY MASS INDEX: 32.43 KG/M2 | DIASTOLIC BLOOD PRESSURE: 64 MMHG | WEIGHT: 214 LBS

## 2024-09-09 DIAGNOSIS — R10.13 EPIGASTRIC PAIN: ICD-10-CM

## 2024-09-09 DIAGNOSIS — R07.89 OTHER CHEST PAIN: ICD-10-CM

## 2024-09-09 DIAGNOSIS — R07.89 OTHER CHEST PAIN: Primary | ICD-10-CM

## 2024-09-09 LAB
ATRIAL RATE: 65 BPM
P AXIS: 56 DEGREES
P-R INTERVAL: 150 MS
Q-T INTERVAL: 380 MS
QRS DURATION: 84 MS
QTC CALCULATION (BEZET): 395 MS
R AXIS: 52 DEGREES
T AXIS: 54 DEGREES
VENTRICULAR RATE: 65 BPM

## 2024-09-09 PROCEDURE — 3074F SYST BP LT 130 MM HG: CPT | Performed by: FAMILY MEDICINE

## 2024-09-09 PROCEDURE — 93000 ELECTROCARDIOGRAM COMPLETE: CPT | Performed by: FAMILY MEDICINE

## 2024-09-09 PROCEDURE — 71046 X-RAY EXAM CHEST 2 VIEWS: CPT | Performed by: FAMILY MEDICINE

## 2024-09-09 PROCEDURE — 3078F DIAST BP <80 MM HG: CPT | Performed by: FAMILY MEDICINE

## 2024-09-09 PROCEDURE — 99214 OFFICE O/P EST MOD 30 MIN: CPT | Performed by: FAMILY MEDICINE

## 2024-09-09 PROCEDURE — 3008F BODY MASS INDEX DOCD: CPT | Performed by: FAMILY MEDICINE

## 2024-09-09 RX ORDER — BUPROPION HYDROCHLORIDE 150 MG/1
150 TABLET ORAL DAILY
Qty: 30 TABLET | Refills: 0 | Status: SHIPPED | OUTPATIENT
Start: 2024-09-09 | End: 2024-10-15

## 2024-09-09 RX ORDER — PANTOPRAZOLE SODIUM 20 MG/1
20 TABLET, DELAYED RELEASE ORAL
Qty: 30 TABLET | Refills: 0 | Status: SHIPPED | OUTPATIENT
Start: 2024-09-09 | End: 2024-09-10

## 2024-09-09 NOTE — PROGRESS NOTES
Subjective:   Denise Martinez is a 39 year old female who presents for Abdominal Pain (X1 week, upper abdomen, pt states it is hard to breath, no acid reflux)     Abdominal/chest pain for 1 week   Mainly epigastric region.  States it is different than pain she went to ER for on 8/12/24 (which was RLQ pain and she was diagnosed with ovarian cyst)   She has some discomfort taking deep breath but no dyspnea on exertion.   No fevers/chills. Denies reflux   Has a h/o anxiety and thinks it might be related.     History/Other:    Chief Complaint Reviewed and Verified  Nursing Notes Reviewed and   Verified  Tobacco Reviewed  Allergies Reviewed  Medications Reviewed    Problem List Reviewed  Medical History Reviewed  Surgical History   Reviewed  OB Status Reviewed  Family History Reviewed  Social History   Reviewed         Tobacco:  Social History     Tobacco Use   Smoking Status Some Days    Current packs/day: 0.00    Types: Cigarettes   Smokeless Tobacco Never   Tobacco Comments    quit [id number]     E-Cigarettes/Vaping       Questions Responses    E-Cigarette Use Current Some Day User          E-Cigarette/Vaping Substances       Questions Responses    Nicotine Yes           Tobacco cessation counseling for <3 minutes.      Current Outpatient Medications   Medication Sig Dispense Refill    FLUoxetine 20 MG Oral Cap TAKE 2 CAPSULES(40 MG) BY MOUTH DAILY 180 capsule 0         Review of Systems:  Review of Systems   All other systems reviewed and are negative.        Objective:   /64   Pulse 70   Resp 18   Ht 5' 8\" (1.727 m)   Wt 214 lb (97.1 kg)   SpO2 97%   BMI 32.54 kg/m²  Estimated body mass index is 32.54 kg/m² as calculated from the following:    Height as of this encounter: 5' 8\" (1.727 m).    Weight as of this encounter: 214 lb (97.1 kg).  Physical Exam  Constitutional:       General: She is not in acute distress.     Appearance: She is not ill-appearing or diaphoretic.   Neck:      Vascular: No  carotid bruit.   Cardiovascular:      Rate and Rhythm: Normal rate and regular rhythm.   Pulmonary:      Effort: Pulmonary effort is normal.      Breath sounds: Normal breath sounds.   Abdominal:      General: Abdomen is flat. Bowel sounds are normal.      Palpations: Abdomen is soft.      Tenderness: There is abdominal tenderness in the epigastric area. There is no guarding or rebound. Negative signs include Das's sign and McBurney's sign.   Lymphadenopathy:      Cervical: No cervical adenopathy.   Skin:     Findings: No rash.   Neurological:      General: No focal deficit present.      Mental Status: She is alert and oriented to person, place, and time.   Psychiatric:         Mood and Affect: Mood normal.         Behavior: Behavior normal.           Assessment & Plan:   1. Other chest pain (Primary)  -     EKG with interpretation and Report -IN OFFICE [49960]  -     XR CHEST PA + LAT CHEST (CPT=71046); Future; Expected date: 09/09/2024  2. Epigastric pain  Other orders  -     buPROPion HCl ER (XL); Take 1 tablet (150 mg total) by mouth daily.  Dispense: 30 tablet; Refill: 0  -     Pantoprazole Sodium; Take 1 tablet (20 mg total) by mouth every morning before breakfast.  Dispense: 30 tablet; Refill: 0    ER notes reviewed   EKG shows sinus rhythm, normal axis, no ST depression or elevation, normal NJ and QT  Chest xray to rule out mass/pneumonia karie with tobacco hx   Possible esophageal spasms   Take PPI for 1 mo, given epigastric location of tenderness - if not improving then see GI   Anxiety likely, will try adding bupropion             Return in about 3 months (around 12/9/2024).    SUN LEDEZMA MD, 9/9/2024, 1:52 PM

## 2024-09-09 NOTE — PATIENT INSTRUCTIONS
Spasm of esophagus and irritation of abdomen   Start maalox 10-20mL every 4-6 hours as needed; not more than 80mL per day     Start pantoprazole 20mg (ok to start today and then take again tomorrow morning)    If symptoms do not improve and chest xray is normal, then we need to have you see     [facility], [facility]     MD Amauri Fajardo MD Gonzalo Pandolfi, MD Shivani J Kiriluk, DO Praveen Mettu, MD Aditya Dholakia, DO Abishek Bhandari, MD Nikhil Seth, MD     [address] [place] [address]  Phone: [phone]

## 2024-09-10 RX ORDER — PANTOPRAZOLE SODIUM 20 MG/1
20 TABLET, DELAYED RELEASE ORAL
Qty: 90 TABLET | Refills: 0 | Status: SHIPPED | OUTPATIENT
Start: 2024-09-10

## 2024-10-15 ENCOUNTER — OFFICE VISIT (OUTPATIENT)
Facility: CLINIC | Age: 39
End: 2024-10-15
Payer: COMMERCIAL

## 2024-10-15 VITALS
SYSTOLIC BLOOD PRESSURE: 110 MMHG | HEART RATE: 85 BPM | BODY MASS INDEX: 33.19 KG/M2 | WEIGHT: 219 LBS | HEIGHT: 68 IN | DIASTOLIC BLOOD PRESSURE: 70 MMHG

## 2024-10-15 DIAGNOSIS — N76.0 VAGINITIS AND VULVOVAGINITIS: ICD-10-CM

## 2024-10-15 DIAGNOSIS — Z12.4 CERVICAL CANCER SCREENING: ICD-10-CM

## 2024-10-15 DIAGNOSIS — N83.201 CYST OF RIGHT OVARY: ICD-10-CM

## 2024-10-15 DIAGNOSIS — Z01.419 ENCOUNTER FOR WELL WOMAN EXAM WITH ROUTINE GYNECOLOGICAL EXAM: Primary | ICD-10-CM

## 2024-10-15 PROCEDURE — 3008F BODY MASS INDEX DOCD: CPT | Performed by: OBSTETRICS & GYNECOLOGY

## 2024-10-15 PROCEDURE — 87624 HPV HI-RISK TYP POOLED RSLT: CPT | Performed by: OBSTETRICS & GYNECOLOGY

## 2024-10-15 PROCEDURE — 99395 PREV VISIT EST AGE 18-39: CPT | Performed by: OBSTETRICS & GYNECOLOGY

## 2024-10-15 PROCEDURE — 3078F DIAST BP <80 MM HG: CPT | Performed by: OBSTETRICS & GYNECOLOGY

## 2024-10-15 PROCEDURE — 81514 NFCT DS BV&VAGINITIS DNA ALG: CPT | Performed by: OBSTETRICS & GYNECOLOGY

## 2024-10-15 PROCEDURE — 87491 CHLMYD TRACH DNA AMP PROBE: CPT | Performed by: OBSTETRICS & GYNECOLOGY

## 2024-10-15 PROCEDURE — 3074F SYST BP LT 130 MM HG: CPT | Performed by: OBSTETRICS & GYNECOLOGY

## 2024-10-15 PROCEDURE — 87591 N.GONORRHOEAE DNA AMP PROB: CPT | Performed by: OBSTETRICS & GYNECOLOGY

## 2024-10-15 NOTE — PROGRESS NOTES
Denise Martinez is a 39 year old female  No LMP recorded. (Menstrual status: IUD - Intrauterine Device).   Chief Complaint   Patient presents with    Wellness Visit     Last pap 3/7/23 neg   Mirena placed 6 years ago   Wants to discuss abdominal ct scan from    .Patient c/o vaginal odor, occasional cramping pains. CT scan  showed 3 cm right ovarian cyst    OBSTETRICS HISTORY:  OB History    Para Term  AB Living   3 3 2 1 0 2   SAB IAB Ectopic Multiple Live Births   0 0     2      # Outcome Date GA Lbr Brannon/2nd Weight Sex Type Anes PTL Lv   3 Term 18 38w0d   M Vag-Spont   JENNIE   2 Term 16 37w0d  7 lb 4.4 oz (3.3 kg) M Vag-Spont EPI N JENNIE   1  11/01/15 24w0d             Birth Comments: TRISOMY 19       GYNE HISTORY:  Periods absent    History   Sexual Activity    Sexual activity: Yes    Partners: Male    Birth control/ protection: I.U.D.     Comment: mirena IUD         Hx Prior Abnormal Pap: Yes  Pap Date: 23  Pap Result Notes: neg        MEDICAL HISTORY:  Past Medical History:    Cholelithiasis without cholecystitis    Chronic cystitis    Chronic pyelonephritis    Gestational diabetes (HCC)    Hyperlipidemia    Problems with swallowing    Shortness of breath       SURGICAL HISTORY:  Past Surgical History:   Procedure Laterality Date    Cholecystectomy  2018    Insert intrauterine device         SOCIAL HISTORY:  Social History     Socioeconomic History    Marital status:      Spouse name: Not on file    Number of children: Not on file    Years of education: Not on file    Highest education level: Not on file   Occupational History    Not on file   Tobacco Use    Smoking status: Some Days     Current packs/day: 0.00     Types: Cigarettes    Smokeless tobacco: Never    Tobacco comments:     quit [id number]   Vaping Use    Vaping status: Some Days    Substances: Nicotine   Substance and Sexual Activity    Alcohol use: No    Drug use: No    Sexual activity: Yes      Partners: Male     Birth control/protection: I.U.D.     Comment: mirena IUD    Other Topics Concern     Service Not Asked    Blood Transfusions Not Asked    Caffeine Concern No    Occupational Exposure Not Asked    Hobby Hazards Not Asked    Sleep Concern Not Asked    Stress Concern No    Weight Concern No    Special Diet No    Back Care Not Asked    Exercise No    Bike Helmet Not Asked    Seat Belt No    Self-Exams Not Asked   Social History Narrative    Not on file     Social Drivers of Health     Financial Resource Strain: Not on file   Food Insecurity: Not on file   Transportation Needs: Not on file   Physical Activity: Not on file   Stress: Not on file   Social Connections: Not on file   Housing Stability: Not on file       FAMILY HISTORY:  No family history on file.    MEDICATIONS:    Current Outpatient Medications:     FLUoxetine 20 MG Oral Cap, TAKE 2 CAPSULES(40 MG) BY MOUTH DAILY, Disp: 180 capsule, Rfl: 0    ALLERGIES:  Allergies[1]      Review of Systems:  Constitutional:  Denies fatigue, night sweats, hot flashes  Eyes:  denies blurred or double vision  Cardiovascular:  denies chest pain or palpitations  Respiratory:  denies shortness of breath  Gastrointestinal:  denies heartburn, abdominal pain, diarrhea or constipation  Genitourinary:  denies dysuria, incontinence, abnormal vaginal discharge, vaginal itching  Musculoskeletal:  denies back pain.  Skin/Breast:  Denies any breast pain, lumps, or discharge.   Neurological:  denies headaches, extremity weakness or numbness.  Psychiatric: denies depression or anxiety.  Endocrine:   denies excessive thirst or urination.  Heme/Lymph:  denies history of anemia, easy bruising or bleeding.      PHYSICAL EXAM:   Constitutional: well developed, well nourished  Head/Face: normocephalic  Neck/Thyroid: thyroid symmetric, no thyromegaly, no nodules, no adenopathy  Lymphatic:no abnormal supraclavicular or axillary adenopathy is noted  Breast: normal  without palpable masses, tenderness, asymmetry, nipple discharge, nipple retraction or skin changes  Abdomen:  soft, nontender, nondistended, no masses  Skin/Hair: no unusual rashes or bruises  Extremities: no edema, no cyanosis  Psychiatric:  Oriented to time, place, person and situation. Appropriate mood and affect    Pelvic Exam:  External Genitalia: normal appearance, hair distribution, and no lesions  Urethral Meatus:  normal in size, location, without lesions and prolapse  Bladder:  No fullness, masses or tenderness  Vagina:  Normal appearance without lesions, no abnormal discharge  Cervix:  Normal without tenderness on motion, IUD strings noted  Uterus: normal in size, contour, position, mobility, without tenderness  Adnexa: normal without masses or tenderness  Perineum: normal  Anus: no hemorroids     Assessment & Plan:  Diagnoses and all orders for this visit:    Encounter for well woman exam with routine gynecological exam    Cervical cancer screening  -     Hpv High Risk , Thin Prep Collect; Future  -     ThinPrep PAP Smear B; Future    Vaginitis and vulvovaginitis  -     Vaginitis Vaginosis PCR Panel; Future  -     Chlamydia/Gc Amplification; Future    Cyst of right ovary  -     US PELVIS W EV (CPT=76856/64925); Future                   [1]   Allergies  Allergen Reactions    Radiology Contrast Iodinated Dyes OTHER (SEE COMMENTS)     syncope

## 2024-10-16 LAB
BV BACTERIA DNA VAG QL NAA+PROBE: NEGATIVE
C GLABRATA DNA VAG QL NAA+PROBE: NEGATIVE
C KRUSEI DNA VAG QL NAA+PROBE: NEGATIVE
C TRACH DNA SPEC QL NAA+PROBE: NEGATIVE
CANDIDA DNA VAG QL NAA+PROBE: NEGATIVE
HPV E6+E7 MRNA CVX QL NAA+PROBE: NEGATIVE
N GONORRHOEA DNA SPEC QL NAA+PROBE: NEGATIVE
T VAGINALIS DNA VAG QL NAA+PROBE: NEGATIVE

## 2024-10-21 LAB
.: NORMAL
.: NORMAL

## 2024-10-28 ENCOUNTER — NURSE TRIAGE (OUTPATIENT)
Dept: FAMILY MEDICINE CLINIC | Facility: CLINIC | Age: 39
End: 2024-10-28

## 2024-10-28 ENCOUNTER — HOSPITAL ENCOUNTER (EMERGENCY)
Age: 39
Discharge: HOME | End: 2024-10-28
Attending: EMERGENCY MEDICINE

## 2024-10-28 VITALS
DIASTOLIC BLOOD PRESSURE: 75 MMHG | SYSTOLIC BLOOD PRESSURE: 101 MMHG | OXYGEN SATURATION: 100 % | RESPIRATION RATE: 16 BRPM | WEIGHT: 215 LBS | HEART RATE: 66 BPM | BODY MASS INDEX: 31.84 KG/M2 | TEMPERATURE: 98 F | HEIGHT: 68.9 IN

## 2024-10-28 DIAGNOSIS — K62.5 BRBPR (BRIGHT RED BLOOD PER RECTUM): Primary | ICD-10-CM

## 2024-10-28 LAB
ALBUMIN SERPL-MCNC: 3.8 G/DL (ref 3.4–5)
ALBUMIN/GLOB SERPL: 1 {RATIO} (ref 1–2)
ALP LIVER SERPL-CCNC: 83 U/L
ALT SERPL-CCNC: 43 U/L
ANION GAP SERPL CALC-SCNC: 4 MMOL/L (ref 0–18)
AST SERPL-CCNC: 19 U/L (ref 15–37)
BASOPHILS # BLD AUTO: 0.05 X10(3) UL (ref 0–0.2)
BASOPHILS NFR BLD AUTO: 0.6 %
BILIRUB SERPL-MCNC: 0.4 MG/DL (ref 0.1–2)
BUN BLD-MCNC: 11 MG/DL (ref 9–23)
CALCIUM BLD-MCNC: 9.4 MG/DL (ref 8.5–10.1)
CHLORIDE SERPL-SCNC: 105 MMOL/L (ref 98–112)
CO2 SERPL-SCNC: 27 MMOL/L (ref 21–32)
CREAT BLD-MCNC: 0.71 MG/DL
EGFRCR SERPLBLD CKD-EPI 2021: 112 ML/MIN/1.73M2 (ref 60–?)
EOSINOPHIL # BLD AUTO: 0.11 X10(3) UL (ref 0–0.7)
EOSINOPHIL NFR BLD AUTO: 1.4 %
ERYTHROCYTE [DISTWIDTH] IN BLOOD BY AUTOMATED COUNT: 13.2 %
GLOBULIN PLAS-MCNC: 3.9 G/DL (ref 2.8–4.4)
GLUCOSE BLD-MCNC: 98 MG/DL (ref 70–99)
HCT VFR BLD AUTO: 42.5 %
HGB BLD-MCNC: 14.6 G/DL
IMM GRANULOCYTES # BLD AUTO: 0.03 X10(3) UL (ref 0–1)
IMM GRANULOCYTES NFR BLD: 0.4 %
LYMPHOCYTES # BLD AUTO: 2.59 X10(3) UL (ref 1–4)
LYMPHOCYTES NFR BLD AUTO: 32.1 %
MCH RBC QN AUTO: 31.1 PG (ref 26–34)
MCHC RBC AUTO-ENTMCNC: 34.4 G/DL (ref 31–37)
MCV RBC AUTO: 90.4 FL
MONOCYTES # BLD AUTO: 0.43 X10(3) UL (ref 0.1–1)
MONOCYTES NFR BLD AUTO: 5.3 %
NEUTROPHILS # BLD AUTO: 4.87 X10 (3) UL (ref 1.5–7.7)
NEUTROPHILS # BLD AUTO: 4.87 X10(3) UL (ref 1.5–7.7)
NEUTROPHILS NFR BLD AUTO: 60.2 %
OSMOLALITY SERPL CALC.SUM OF ELEC: 281 MOSM/KG (ref 275–295)
PLATELET # BLD AUTO: 361 10(3)UL (ref 150–450)
POTASSIUM SERPL-SCNC: 4.1 MMOL/L (ref 3.5–5.1)
PROT SERPL-MCNC: 7.7 G/DL (ref 6.4–8.2)
RBC # BLD AUTO: 4.7 X10(6)UL
SODIUM SERPL-SCNC: 136 MMOL/L (ref 136–145)
WBC # BLD AUTO: 8.1 X10(3) UL (ref 4–11)

## 2024-10-28 PROCEDURE — 99283 EMERGENCY DEPT VISIT LOW MDM: CPT

## 2024-10-28 PROCEDURE — 99284 EMERGENCY DEPT VISIT MOD MDM: CPT

## 2024-10-28 PROCEDURE — 80053 COMPREHEN METABOLIC PANEL: CPT | Performed by: EMERGENCY MEDICINE

## 2024-10-28 PROCEDURE — 36415 COLL VENOUS BLD VENIPUNCTURE: CPT

## 2024-10-28 PROCEDURE — 82272 OCCULT BLD FECES 1-3 TESTS: CPT

## 2024-10-28 PROCEDURE — 85025 COMPLETE CBC W/AUTO DIFF WBC: CPT | Performed by: EMERGENCY MEDICINE

## 2024-10-28 NOTE — ED INITIAL ASSESSMENT (HPI)
2 months with blood when she had a bowel movement on toilet paper.  States 3 days ago it was more blood but today only when she wipes.  Reports mild abd pain that is constant.
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
## 2024-10-28 NOTE — TELEPHONE ENCOUNTER
Action Requested: Summary for Provider     []  Critical Lab, Recommendations Needed  [] Need Additional Advice  [x]   FYI    []   Need Orders  [] Need Medications Sent to Pharmacy  []  Other     SUMMARY: sent to ER for rectal bleeding    Reason for call: Blood In Stool  Onset: 2 months ago    Patient called in and states that 2 months ago she noticed blood on her toilet paper after she had bowel movement, this has been going on for 2 months now, yesterday after having a bowel movement she noticed her entire toilet bowl was full of blood. This has happened a total of 1 times. She has not had a bowel movement since and she has no bleeding at other times. Denies lightheaded or dizziness, she does have mild abdominal cramping. She does complain of fatigue  Advised to go to ER for evaluation due to the amount of blood.       Reason for Disposition   MODERATE rectal bleeding (small blood clots, passing blood without stool, or toilet water turns red) more than once a day    Protocols used: Rectal Bleeding-A-OH

## 2024-11-06 ENCOUNTER — PATIENT MESSAGE (OUTPATIENT)
Dept: FAMILY MEDICINE CLINIC | Facility: CLINIC | Age: 39
End: 2024-11-06

## 2024-11-06 NOTE — TELEPHONE ENCOUNTER
Pt has RS appt to 11/23/24 via [employer].     Called pt for update on ins, per pt she is on the other line with ins and will make changes to appt via [employer] if needed.

## 2024-11-15 NOTE — TELEPHONE ENCOUNTER
E-Prescribing Status: Receipt confirmed by pharmacy (11/4/2024 10:03 AM CST)     Rx was sent on 11/4/24

## 2025-01-06 ENCOUNTER — HOSPITAL ENCOUNTER (OUTPATIENT)
Dept: GENERAL RADIOLOGY | Age: 40
Discharge: HOME | End: 2025-01-06
Attending: FAMILY MEDICINE
Payer: COMMERCIAL

## 2025-01-06 ENCOUNTER — OFFICE VISIT (OUTPATIENT)
Dept: FAMILY MEDICINE CLINIC | Facility: CLINIC | Age: 40
End: 2025-01-06
Payer: COMMERCIAL

## 2025-01-06 VITALS
WEIGHT: 229 LBS | DIASTOLIC BLOOD PRESSURE: 84 MMHG | HEART RATE: 88 BPM | HEIGHT: 68 IN | BODY MASS INDEX: 34.71 KG/M2 | OXYGEN SATURATION: 98 % | SYSTOLIC BLOOD PRESSURE: 124 MMHG

## 2025-01-06 DIAGNOSIS — M54.41 CHRONIC MIDLINE LOW BACK PAIN WITH RIGHT-SIDED SCIATICA: Primary | ICD-10-CM

## 2025-01-06 DIAGNOSIS — G89.29 CHRONIC MIDLINE LOW BACK PAIN WITH RIGHT-SIDED SCIATICA: Primary | ICD-10-CM

## 2025-01-06 DIAGNOSIS — M54.18 SACRAL RADICULITIS: ICD-10-CM

## 2025-01-06 DIAGNOSIS — R07.81 COSTAL MARGIN PAIN: ICD-10-CM

## 2025-01-06 DIAGNOSIS — K92.1 HEMATOCHEZIA: ICD-10-CM

## 2025-01-06 PROCEDURE — 3008F BODY MASS INDEX DOCD: CPT | Performed by: FAMILY MEDICINE

## 2025-01-06 PROCEDURE — 3079F DIAST BP 80-89 MM HG: CPT | Performed by: FAMILY MEDICINE

## 2025-01-06 PROCEDURE — 99214 OFFICE O/P EST MOD 30 MIN: CPT | Performed by: FAMILY MEDICINE

## 2025-01-06 PROCEDURE — 3074F SYST BP LT 130 MM HG: CPT | Performed by: FAMILY MEDICINE

## 2025-01-06 PROCEDURE — 71101 X-RAY EXAM UNILAT RIBS/CHEST: CPT | Performed by: FAMILY MEDICINE

## 2025-01-06 RX ORDER — PANTOPRAZOLE SODIUM 40 MG/1
40 TABLET, DELAYED RELEASE ORAL
Qty: 30 TABLET | Refills: 0 | Status: SHIPPED | OUTPATIENT
Start: 2025-01-06

## 2025-01-06 RX ORDER — PANTOPRAZOLE SODIUM 40 MG/1
40 TABLET, DELAYED RELEASE ORAL
Qty: 90 TABLET | Refills: 0 | OUTPATIENT
Start: 2025-01-06

## 2025-01-06 NOTE — PROGRESS NOTES
Subjective:   Denise Martinez is a 39 year old female who presents for Back Pain (Patient Is having really bad lower back pain /Also about a month ago she went to the er for blood in her stool/She is still having on going lower pain on the left side )         Blood in stool   2.5 mo ago she noticed sudden hematochezia  Every time she had a BM she noticed blood on toilet tissue for 2 straight weeks.   2 times she saw blood in the toilet bowl as well. Has not seen   No unexplained weight loss     LUQ pain   Usually after eating. Has had for 2 months   Some days no pain.   Also finding it hurts to touch. Has been taking TUMS which helps.       Back pain   Low, midline   Around lumbosacral region   XR sacrum normal   Pain radiates anteriorly, somestimes down right anterior thigh   Every day. Unbearable        History/Other:    Chief Complaint Reviewed and Verified  Nursing Notes Reviewed and   Verified  Tobacco Reviewed  Allergies Reviewed  Medications Reviewed    Problem List Reviewed  Medical History Reviewed  Surgical History   Reviewed  Family History Reviewed  Social History Reviewed         Tobacco:  Social History     Tobacco Use   Smoking Status Some Days    Current packs/day: 0.00    Types: Cigarettes   Smokeless Tobacco Never   Tobacco Comments    quit [id number]     E-Cigarettes/Vaping       Questions Responses    E-Cigarette Use Current Some Day User    Passive Exposure Yes    Counseling Given Yes          E-Cigarette/Vaping Substances       Questions Responses    Nicotine Yes    THC No    CBD No    Flavoring No          E-Cigarette/Vaping Devices       Questions Responses    Disposable No    Pre-filled or Refillable Cartridge No    Refillable Tank No    Pre-filled Pod No               Current Outpatient Medications   Medication Sig Dispense Refill    pantoprazole 40 MG Oral Tab EC Take 1 tablet (40 mg total) by mouth every morning before breakfast. 30 tablet 0    FLUOXETINE 20 MG Oral Cap TAKE 2  CAPSULES(40 MG) BY MOUTH DAILY 180 capsule 0         Review of Systems:  Review of Systems   Constitutional:  Negative for chills and fever.   HENT:  Negative for rhinorrhea and sinus pressure.    Eyes:  Negative for pain and visual disturbance.   Respiratory:  Negative for cough and shortness of breath.    Cardiovascular:  Negative for chest pain and palpitations.   Gastrointestinal:  Negative for abdominal pain, nausea and vomiting.   Genitourinary:  Negative for dysuria, frequency and urgency.   Musculoskeletal:  Positive for back pain. Negative for arthralgias and myalgias.   Skin:  Negative for pallor and rash.   Neurological:  Negative for dizziness and headaches.       Objective:   /84   Pulse 88   Ht 5' 8\" (1.727 m)   Wt 229 lb (103.9 kg)   SpO2 98%   BMI 34.82 kg/m²  Estimated body mass index is 34.82 kg/m² as calculated from the following:    Height as of this encounter: 5' 8\" (1.727 m).    Weight as of this encounter: 229 lb (103.9 kg).  Physical Exam  Constitutional:       General: She is not in acute distress.  Cardiovascular:      Rate and Rhythm: Normal rate and regular rhythm.   Pulmonary:      Effort: Pulmonary effort is normal.      Breath sounds: Normal breath sounds.   Abdominal:      Tenderness: There is abdominal tenderness (around costal margin) in the left upper quadrant.       Musculoskeletal:        Back:    Skin:     Findings: No rash.   Neurological:      General: No focal deficit present.      Mental Status: She is alert and oriented to person, place, and time.   Psychiatric:         Mood and Affect: Mood normal.         Behavior: Behavior normal.           Assessment & Plan:   1. Chronic midline low back pain with right-sided sciatica (Primary)  -     MRI SPINE LUMBAR SACRUM PLEXUS (CPT=72148,57394); Future; Expected date: 01/06/2025  Not improving     2. Sacral radiculitis  -     MRI SPINE LUMBAR SACRUM PLEXUS (CPT=72148,76121); Future; Expected date: 01/06/2025  Persistent  - interfering with function     3. Costal margin pain  -     XR RIBS WITH CHEST (3 VIEWS), LEFT  (CPT=71101); Future; Expected date: 01/06/2025  PUD? Will start PPI     4. Hematochezia  - possible anal fissure   - has appt with GI     Other orders  -     Pantoprazole Sodium; Take 1 tablet (40 mg total) by mouth every morning before breakfast.  Dispense: 30 tablet; Refill: 0          Return in about 4 months (around 5/6/2025).    SUN LEDEZMA MD, 1/6/2025, 10:34 AM

## 2025-01-06 NOTE — PATIENT INSTRUCTIONS
Blood in stool likely from hemorrhoids or anal fissure - see info  Start pantoprazole   See GI in 2 days       For weight   - after we know stomach and bowels are ok - then we can try adding bupropion (and maybe decreasing fluoxetine)   - add fiber supplement (Benefiber or Metamucil) - will help weight and decrease constipation and blood in stool       For lower back schedule MRI

## 2025-01-29 ENCOUNTER — HOSPITAL ENCOUNTER (EMERGENCY)
Age: 40
Discharge: HOME | End: 2025-01-29
Payer: COMMERCIAL

## 2025-01-29 VITALS
OXYGEN SATURATION: 96 % | DIASTOLIC BLOOD PRESSURE: 68 MMHG | HEIGHT: 69 IN | HEART RATE: 85 BPM | TEMPERATURE: 98 F | BODY MASS INDEX: 33.33 KG/M2 | WEIGHT: 225 LBS | SYSTOLIC BLOOD PRESSURE: 127 MMHG | RESPIRATION RATE: 16 BRPM

## 2025-01-29 DIAGNOSIS — M54.18 SACRAL RADICULITIS: Primary | ICD-10-CM

## 2025-01-29 PROCEDURE — 99283 EMERGENCY DEPT VISIT LOW MDM: CPT

## 2025-01-29 RX ORDER — METHYLPREDNISOLONE 4 MG/1
TABLET ORAL
Qty: 1 EACH | Refills: 0 | Status: SHIPPED | OUTPATIENT
Start: 2025-01-29

## 2025-01-29 RX ORDER — TRAMADOL HYDROCHLORIDE 50 MG/1
TABLET ORAL EVERY 6 HOURS PRN
Qty: 10 TABLET | Refills: 0 | Status: SHIPPED | OUTPATIENT
Start: 2025-01-29 | End: 2025-02-03

## 2025-01-30 NOTE — ED INITIAL ASSESSMENT (HPI)
Pt to ED with pain to left buttock radiating to coccyx x2-3 weeks. Noted redness to left buttock, not draining about a week ago. Afebrile.

## 2025-01-30 NOTE — ED PROVIDER NOTES
Patient Seen in: [facility] In [place]      History     Chief Complaint   Patient presents with    Back Pain     Stated Complaint: Mid back pain for the past 2-3 weeks. Difficulty sitting. Seen at two [facility]    Subjective:   HPI      39-year-old female.  Patient explains that she has had persistent low back pain and coccyx pain for the past 1 year.  This has been an ongoing issue with previous plain film imaging and CT scan.  Her primary care physician recently ordered an MRI scheduled for this Friday, 2 days from now.  Unfortunately, patient will be unable to make this imaging appointment and arrives to the ER.  Her pain is worse in the last 2 to 3 weeks.  She is having no fever or chills.  No changes to her bowel habits.  No urinary complaints.  No numbness or weakness of the lower extremities.  No saddle anesthesias.    Objective:     Past Medical History:    Abdominal pain    Anxiety    Back pain    Bloating    Blood in the stool    Cholelithiasis without cholecystitis    Chronic cystitis    Chronic pyelonephritis    Fatigue    Gestational diabetes (HCC)    Hemorrhoids    High cholesterol    Hyperlipidemia    Pain with bowel movements    Problems with swallowing    Shortness of breath    Weight gain              Past Surgical History:   Procedure Laterality Date    Cholecystectomy  08/22/2018    Insert intrauterine device                  Social History     Socioeconomic History    Marital status:    Tobacco Use    Smoking status: Some Days     Current packs/day: 0.00     Types: Cigarettes    Smokeless tobacco: Former    Tobacco comments:     Vape occasionally   Vaping Use    Vaping status: Some Days    Substances: Nicotine    Passive vaping exposure: Yes   Substance and Sexual Activity    Alcohol use: Never    Drug use: Never    Sexual activity: Yes     Partners: Male     Birth control/protection: I.U.D.     Comment: mirena IUD    Other Topics Concern    Caffeine Concern Yes    Stress  Concern No    Weight Concern Yes    Special Diet No    Exercise No    Seat Belt No                  Physical Exam     ED Triage Vitals [01/29/25 2044]   /68   Pulse 85   Resp 16   Temp 98.2 °F (36.8 °C)   Temp src Oral   SpO2 96 %   O2 Device None (Room air)       Current Vitals:   Vital Signs  BP: 127/68  Pulse: 85  Resp: 16  Temp: 98.2 °F (36.8 °C)  Temp src: Oral    Oxygen Therapy  SpO2: 96 %  O2 Device: None (Room air)        Physical Exam     Gen: Well appearing, well groomed, alert and aware x 3  Extremities: Full ROM, strength and sensation are equal.  Two-point discrimination is intact.  DTRs are appropriate and equal.  Abdominal: Soft exam.  No distention.  No pain to palpation all 4 quadrants.  No evidence of hernia to the inguinal region  Back:  general pain to palpation through the coccyx without erythema, swelling, fluctuance or induration.  No piriformis tenderness  Neuro: Cranial nerves intact, Normal Gait.  ED Course   Labs Reviewed - No data to display           MDM        Medical record reviewed.  The patient's primary care physician saw the patient for this complaint quite recently and ordered an outpatient MRI.  This is scheduled in 48 hours but the patient states that she is going to be unable to make it to this appointment.  She questions whether the MRI can be performed in the ER.  Patient is ambulating with no difficulty.  She has normal vital signs.  She has no complaint of saddle anesthesias or incontinence.  No fevers.  Normal bowel habits.  Patient will need to reschedule her MRI.  She was placed on a steroid taper and tramadol.  Thorough evaluation was performed with no evidence of infectious process.  Patient maintains excellent strength of the bilateral lower extremities.    Follow new steroid taper as written.  Tramadol as needed for more severe pain.  Reschedule your MRI        Medical Decision Making      Disposition and Plan     Clinical Impression:  1. Sacral radiculitis          Disposition:  Discharge  1/29/2025  9:16 pm    Follow-up:  Nicolasa Mirza MD  [id number]  [facility] [address]  [phone]    Follow up            Medications Prescribed:  Current Discharge Medication List        START taking these medications    Details   methylPREDNISolone (MEDROL) 4 MG Oral Tablet Therapy Pack Dosepack: take as directed  Qty: 1 each, Refills: 0      traMADol 50 MG Oral Tab Take 1-2 tablets ( mg total) by mouth every 6 (six) hours as needed for Pain.  Qty: 10 tablet, Refills: 0    Associated Diagnoses: Sacral radiculitis                 Supplementary Documentation:

## 2025-01-30 NOTE — DISCHARGE INSTRUCTIONS
Follow new steroid taper as written.  Tramadol as needed for more severe pain.  Reschedule your MRI

## 2025-02-08 ENCOUNTER — OFFICE VISIT (OUTPATIENT)
Dept: FAMILY MEDICINE CLINIC | Facility: CLINIC | Age: 40
End: 2025-02-08
Payer: COMMERCIAL

## 2025-02-08 VITALS
OXYGEN SATURATION: 98 % | RESPIRATION RATE: 16 BRPM | HEIGHT: 69 IN | SYSTOLIC BLOOD PRESSURE: 98 MMHG | HEART RATE: 77 BPM | DIASTOLIC BLOOD PRESSURE: 79 MMHG | TEMPERATURE: 99 F | BODY MASS INDEX: 33 KG/M2

## 2025-02-08 DIAGNOSIS — J04.0 LARYNGITIS: ICD-10-CM

## 2025-02-08 DIAGNOSIS — J02.9 SORE THROAT: Primary | ICD-10-CM

## 2025-02-08 LAB
CONTROL LINE PRESENT WITH A CLEAR BACKGROUND (YES/NO): YES YES/NO
KIT LOT #: NORMAL NUMERIC

## 2025-02-08 PROCEDURE — 3074F SYST BP LT 130 MM HG: CPT | Performed by: FAMILY MEDICINE

## 2025-02-08 PROCEDURE — 3078F DIAST BP <80 MM HG: CPT | Performed by: FAMILY MEDICINE

## 2025-02-08 PROCEDURE — 87880 STREP A ASSAY W/OPTIC: CPT | Performed by: FAMILY MEDICINE

## 2025-02-08 PROCEDURE — 99213 OFFICE O/P EST LOW 20 MIN: CPT | Performed by: FAMILY MEDICINE

## 2025-02-08 RX ORDER — PREDNISONE 20 MG/1
TABLET ORAL
Qty: 10 TABLET | Refills: 0 | Status: SHIPPED | OUTPATIENT
Start: 2025-02-08

## 2025-02-08 NOTE — PATIENT INSTRUCTIONS
Take prednisone-- 2 tabs once daily for 5 days. Take with food.   While you are on steroids it is preferred that you take Tylenol for pain if needed rather than ibuprofen (Motrin/Ibuprofen) or Aleve.  Use OTC meds for comfort as needed--  Use Benadryl at bedtime to reduce drainage and promote rest.  Zyrtec/Claritin/Allegra in the AM to reduce nasal drainage without sedation.   Use saline nasal sprays to reduce congestion and thin secretions.   Use Delsym for cough.   Consider applying francisca's vapo-rub or eucayptus oil to chest and feet at bedtime to reduce chest and nasal congestion.   Warm tea with honey, cough lozenges, vaporizers/steam etc.

## 2025-02-08 NOTE — PROGRESS NOTES
CHIEF COMPLAINT:     Chief Complaint   Patient presents with    Cold     Loss of voice x 2 days,  sore throat started today, productive cough         HPI:   Denise Martinez is a 39 year old female presents to clinic with complaint of sore throat. Patient has had since 2 days ago.  Developed hoarseness yesterday, today with no voice at all.  Patient reports  congestion, cough. Denies fever, headache, nausea, rash. Has no recent history of strep throat. No one is sick at home.  Treating symptoms with nothing at this time.   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  Requesting steroids.    Current Outpatient Medications   Medication Sig Dispense Refill    predniSONE 20 MG Oral Tab TAKE 2 TABS BY MOUTH DAILY FOR 5 DAYS 10 tablet 0    methylPREDNISolone (MEDROL) 4 MG Oral Tablet Therapy Pack Dosepack: take as directed 1 each 0    GAVILYTE-G 236 g Oral Recon Soln Take 4,000 mL by mouth once. (Patient not taking: Reported on 1/21/2025)      pantoprazole 40 MG Oral Tab EC Take 1 tablet (40 mg total) by mouth every morning before breakfast. (Patient not taking: Reported on 1/29/2025) 30 tablet 0    FLUOXETINE 20 MG Oral Cap TAKE 2 CAPSULES(40 MG) BY MOUTH DAILY 180 capsule 0     No current facility-administered medications for this visit.      Past Medical History:    Abdominal pain    Anxiety    Back pain    Bloating    Blood in the stool    Cholelithiasis without cholecystitis    Chronic cystitis    Chronic pyelonephritis    Fatigue    Gestational diabetes (HCC)    Hemorrhoids    High cholesterol    Hyperlipidemia    Pain with bowel movements    Problems with swallowing    Shortness of breath    Weight gain      Social History:  Social History     Socioeconomic History    Marital status:    Tobacco Use    Smoking status: Some Days     Current packs/day: 0.00     Types: Cigarettes    Smokeless tobacco: Former    Tobacco comments:     Vape occasionally   Vaping Use    Vaping  status: Some Days    Substances: Nicotine    Passive vaping exposure: Yes   Substance and Sexual Activity    Alcohol use: Never    Drug use: Never    Sexual activity: Yes     Partners: Male     Birth control/protection: I.U.D.     Comment: mirena IUD    Other Topics Concern    Caffeine Concern Yes    Stress Concern No    Weight Concern Yes    Special Diet No    Exercise No    Seat Belt No        REVIEW OF SYSTEMS:   GENERAL HEALTH: feels well otherwise, Normal appetite  SKIN: denies any unusual skin lesions or rashes  HEENT: denies ear pain, See HPI  RESPIRATORY: denies shortness of breath or wheezing  CARDIOVASCULAR: denies chest pain or palpitations   GI: denies vomiting or diarrhea  NEURO: denies dizziness or lightheadedness    EXAM:   BP 98/79   Pulse 77   Temp 98.5 °F (36.9 °C)   Resp 16   Ht 5' 9\" (1.753 m)   SpO2 98%   BMI 33.23 kg/m²   GENERAL: well developed, well nourished,in no apparent distress. Voice hoarse.  SKIN: no rashes,no suspicious lesions  HEAD: atraumatic, normocephalic  EYES: conjunctivae clear, EOM intact  EARS: TM's clear, non-injected, no bulging, retraction, or fluid bilaterally  NOSE: nostrils patent, no exudates, nasal mucosa pink and noninflamed  THROAT: oral mucosa pink, moist. Posterior pharynx erythematous and injected. No exudates. Tonsils 2/4.  Breath not malodorous   NECK: supple, non-tender  LUNGS: clear to auscultation bilaterally, no wheezes or rhonchi. Breathing is non labored.  CARDIO: RRR without murmur  GI: Normoactive BS x4, no masses, HSM, or tenderness on direct palpation  EXTREMITIES: no cyanosis, clubbing or edema  LYMPH: no anterior cervical and submandibular lymphadenopathy.  No posterior cervical or occipital lymphadenopathy.    Recent Results (from the past 24 hours)   Strep A Assay W/Optic    Collection Time: 02/08/25 11:28 AM   Result Value Ref Range    Strep Grp A Screen neg Negative    Control Line Present with a clear background (yes/no) yes Yes/No     Kit Lot # [id number] Numeric    Kit Expiration Date 11/4/25 Date         ASSESSMENT AND PLAN:     Encounter Diagnoses   Name Primary?    Sore throat Yes    Laryngitis        Orders Placed This Encounter   Procedures    Strep A Assay W/Optic       Meds & Refills for this Visit:  Requested Prescriptions     Signed Prescriptions Disp Refills    predniSONE 20 MG Oral Tab 10 tablet 0     Sig: TAKE 2 TABS BY MOUTH DAILY FOR 5 DAYS       Imaging & Consults:  None.    Comfort measures explained and discussed as listed in Patient Instructions    Follow up in 3-5 days if not improving, condition worsens, or fever greater than or equal to 100.4 persists for 72 hours.      Patient Instructions   Take prednisone-- 2 tabs once daily for 5 days. Take with food.   While you are on steroids it is preferred that you take Tylenol for pain if needed rather than ibuprofen (Motrin/Ibuprofen) or Aleve.  Use OTC meds for comfort as needed--  Use Benadryl at bedtime to reduce drainage and promote rest.  Zyrtec/Claritin/Allegra in the AM to reduce nasal drainage without sedation.   Use saline nasal sprays to reduce congestion and thin secretions.   Use Delsym for cough.   Consider applying francisca's vapo-rub or eucayptus oil to chest and feet at bedtime to reduce chest and nasal congestion.   Warm tea with honey, cough lozenges, vaporizers/steam etc.      The patient/parent indicates understanding of these issues and agrees to the plan.  The patient is asked to follow up with their PCP as needed.

## 2025-02-24 ENCOUNTER — PATIENT MESSAGE (OUTPATIENT)
Facility: CLINIC | Age: 40
End: 2025-02-24

## 2025-02-24 ENCOUNTER — HOSPITAL ENCOUNTER (OUTPATIENT)
Dept: MRI IMAGING | Age: 40
Discharge: HOME | End: 2025-02-24
Attending: FAMILY MEDICINE
Payer: COMMERCIAL

## 2025-02-24 ENCOUNTER — TELEPHONE (OUTPATIENT)
Dept: INTERNAL MEDICINE CLINIC | Facility: CLINIC | Age: 40
End: 2025-02-24

## 2025-02-24 DIAGNOSIS — N83.201 CYST OF RIGHT OVARY: Primary | ICD-10-CM

## 2025-02-24 DIAGNOSIS — M54.41 CHRONIC MIDLINE LOW BACK PAIN WITH RIGHT-SIDED SCIATICA: ICD-10-CM

## 2025-02-24 DIAGNOSIS — M54.18 SACRAL RADICULITIS: ICD-10-CM

## 2025-02-24 DIAGNOSIS — G89.29 CHRONIC MIDLINE LOW BACK PAIN WITH RIGHT-SIDED SCIATICA: ICD-10-CM

## 2025-02-24 PROCEDURE — 72195 MRI PELVIS W/O DYE: CPT | Performed by: FAMILY MEDICINE

## 2025-02-24 PROCEDURE — 72148 MRI LUMBAR SPINE W/O DYE: CPT | Performed by: FAMILY MEDICINE

## 2025-02-24 NOTE — TELEPHONE ENCOUNTER
Simona with MRI calling to confirm MRI order. States that the MRI that has been ordered will only look at L3 & pelvis, won't see any herniated discs.    Dr. Mirza - Can you verify that MRI? The one ordered will only view L3 & Pelvis, it won't see any herniated discs

## 2025-02-25 ENCOUNTER — ULTRASOUND ENCOUNTER (OUTPATIENT)
Facility: CLINIC | Age: 40
End: 2025-02-25
Payer: COMMERCIAL

## 2025-02-28 DIAGNOSIS — M54.41 CHRONIC MIDLINE LOW BACK PAIN WITH RIGHT-SIDED SCIATICA: ICD-10-CM

## 2025-02-28 DIAGNOSIS — M54.18 SACRAL RADICULITIS: Primary | ICD-10-CM

## 2025-02-28 DIAGNOSIS — G89.29 CHRONIC MIDLINE LOW BACK PAIN WITH RIGHT-SIDED SCIATICA: ICD-10-CM

## 2025-03-06 ENCOUNTER — SPINE CENTER NAVIGATION (OUTPATIENT)
Age: 40
End: 2025-03-06

## 2025-03-06 NOTE — PROGRESS NOTES
Spine Center Referral Navigation Encounter Note    Referred by: SUN LEDEZMA MD     Imaging: MRI Spine Lumbar Sacrum Plexus 2/24/25  If imaging done at an external facility, instructed patient to bring disc of MRI to appointment.     Previously Seen Spine Care Providers: None    Referred to: Martín Mckeon PA-C in Pain Management     Information below is patient reported.     [facility] [employer]  Will the patient use insurance for this visit?      Yes    [facility]   Are you currently experiencing any of the following symptoms?      No    Is your condition due to an injury that occurred at work or is your care for this condition being coordinated by Worker's Compensation?      No    Are you looking for a second surgical opinion?      No    Have you had surgery on your spine (neck or back) in the last 12 months?      No    In the last several weeks, have you experienced weakness or balance issues that have caused falls or difficulty lifting your legs or feet (lower body) and/or weakness that has caused you to drop items or caused changes in handwriting (upper body)?      No    In the last 3 months, have you had spine injections AND physical therapy for your back or neck that did not improve your symptoms?      No    : Patient needs to be seen by non-surgical team. Does patient require a new visit or established visit?      New patient visit    Are you closer to [place] or [facility]?      [facility]         3/6-  Spoke to patient. She stated she is at work. I requested to call her back later. She told me I can call back around 4 or 5 pm.    3/6- Spoke to patient and was able to schedule for  3/10 at 3:30pm.

## 2025-04-15 ENCOUNTER — OFFICE VISIT (OUTPATIENT)
Facility: CLINIC | Age: 40
End: 2025-04-15
Payer: COMMERCIAL

## 2025-04-15 VITALS
WEIGHT: 229 LBS | HEART RATE: 94 BPM | HEIGHT: 68 IN | DIASTOLIC BLOOD PRESSURE: 62 MMHG | SYSTOLIC BLOOD PRESSURE: 118 MMHG | BODY MASS INDEX: 34.71 KG/M2

## 2025-04-15 DIAGNOSIS — N83.201 RIGHT OVARIAN CYST: Primary | ICD-10-CM

## 2025-04-15 PROCEDURE — 3008F BODY MASS INDEX DOCD: CPT | Performed by: OBSTETRICS & GYNECOLOGY

## 2025-04-15 PROCEDURE — 3074F SYST BP LT 130 MM HG: CPT | Performed by: OBSTETRICS & GYNECOLOGY

## 2025-04-15 PROCEDURE — 99213 OFFICE O/P EST LOW 20 MIN: CPT | Performed by: OBSTETRICS & GYNECOLOGY

## 2025-04-15 PROCEDURE — 3078F DIAST BP <80 MM HG: CPT | Performed by: OBSTETRICS & GYNECOLOGY

## 2025-04-15 NOTE — PROGRESS NOTES
Denise Martinez is a 40 year old female  No LMP recorded. (Menstrual status: IUD - Intrauterine Device).   Chief Complaint   Patient presents with    Test Results     Discuss ultrasound from 2025 and MRI 2025   .Patient would like to discussed her US results in february    OBSTETRICS HISTORY:  OB History    Para Term  AB Living   3 3 2 1 0 2   SAB IAB Ectopic Multiple Live Births   0 0   2      # Outcome Date GA Lbr Brannon/2nd Weight Sex Type Anes PTL Lv   3 Term 18 38w0d   M Vag-Spont   JENNIE   2 Term 16 37w0d  7 lb 4.4 oz (3.3 kg) M Vag-Spont EPI N JENNIE   1  11/01/15 24w0d             Birth Comments: TRISOMY 19       GYNE HISTORY:  Periods regular monthly    History   Sexual Activity    Sexual activity: Yes    Partners: Male    Birth control/ protection: I.U.D.     Comment: mirena IUD                  MEDICAL HISTORY:  Past Medical History[1]    SURGICAL HISTORY:  Past Surgical History[2]    SOCIAL HISTORY:  Social History     Socioeconomic History    Marital status:      Spouse name: Not on file    Number of children: Not on file    Years of education: Not on file    Highest education level: Not on file   Occupational History    Not on file   Tobacco Use    Smoking status: Some Days     Current packs/day: 0.00     Types: Cigarettes    Smokeless tobacco: Former    Tobacco comments:     Vape occasionally   Vaping Use    Vaping status: Some Days    Substances: Nicotine    Passive vaping exposure: Yes   Substance and Sexual Activity    Alcohol use: Never    Drug use: Never    Sexual activity: Yes     Partners: Male     Birth control/protection: I.U.D.     Comment: mirena IUD    Other Topics Concern     Service Not Asked    Blood Transfusions Not Asked    Caffeine Concern Yes    Occupational Exposure Not Asked    Hobby Hazards Not Asked    Sleep Concern Not Asked    Stress Concern No    Weight Concern Yes    Special Diet No    Back Care Not Asked    Exercise No     Bike Helmet Not Asked    Seat Belt No    Self-Exams Not Asked   Social History Narrative    Not on file     Social Drivers of Health     Food Insecurity: Not on file   Transportation Needs: Not on file   Stress: Not on file   Housing Stability: Not on file       FAMILY HISTORY:  Family History[3]    MEDICATIONS:  Medications - Current[4]    ALLERGIES:  Allergies[5]          PHYSICAL EXAM:   Constitutional: well developed, well nourished  Head/Face: normocephalic  Skin/Hair: no unusual rashes or bruises  Extremities: no edema, no cyanosis  Psychiatric:  Oriented to time, place, person and situation. Appropriate mood and affect    TRANSVAGINAL ULTRASOUND IS PERFORMED.   ANTEVERTED UTERUS SEEN WITH FIBROID(3.2X3.0X2.9CM) AT TTHE POSTERIOR BODY OF THE UTERUS   ENDOMETRIUM( 7.0 MM )SEEN WITH SCANT AMOUNT OF FLUID.   IUD SEEN IN THE ENDOOMETRIAL CAVITY   RT OVARY - CLEAR CYST(4.6X3.8X3.5 CM). WITH PERIPHERAL COLOR FLOW   LT OVARY SEEN WITH FOLLICLES   NO FREE FLUID IN THE CDS     Discussed benign appearing simple ovarian cyst , short term follow up, if getting bigger may consider cystectomy    Assessment & Plan:  Diagnoses and all orders for this visit:    Right ovarian cyst  -     US PELVIS EV W DOPPLER (CPT=76856/23974/48819); Future                   [1]   Past Medical History:   Abdominal pain    Anxiety    Back pain    Bloating    Blood in the stool    Cholelithiasis without cholecystitis    Chronic cystitis    Chronic pyelonephritis    Fatigue    Gestational diabetes (HCC)    Hemorrhoids    High cholesterol    Hyperlipidemia    Pain with bowel movements    Problems with swallowing    Shortness of breath    Weight gain   [2]   Past Surgical History:  Procedure Laterality Date    Cholecystectomy  08/22/2018    Insert intrauterine device     [3] No family history on file.  [4]   Current Outpatient Medications:     predniSONE 20 MG Oral Tab, TAKE 2 TABS BY MOUTH DAILY FOR 5 DAYS, Disp: 10 tablet, Rfl: 0     methylPREDNISolone (MEDROL) 4 MG Oral Tablet Therapy Pack, Dosepack: take as directed, Disp: 1 each, Rfl: 0    GAVILYTE-G 236 g Oral Recon Soln, Take 4,000 mL by mouth once. (Patient not taking: Reported on 4/15/2025), Disp: , Rfl:     pantoprazole 40 MG Oral Tab EC, Take 1 tablet (40 mg total) by mouth every morning before breakfast. (Patient not taking: Reported on 1/29/2025), Disp: 30 tablet, Rfl: 0    FLUOXETINE 20 MG Oral Cap, TAKE 2 CAPSULES(40 MG) BY MOUTH DAILY (Patient not taking: Reported on 4/15/2025), Disp: 180 capsule, Rfl: 0  [5]   Allergies  Allergen Reactions    Radiology Contrast Iodinated Dyes OTHER (SEE COMMENTS)     syncope

## 2025-05-19 ENCOUNTER — HOSPITAL ENCOUNTER (OUTPATIENT)
Dept: ULTRASOUND IMAGING | Age: 40
Discharge: HOME | End: 2025-05-19
Attending: OBSTETRICS & GYNECOLOGY
Payer: COMMERCIAL

## 2025-05-19 DIAGNOSIS — N83.201 RIGHT OVARIAN CYST: ICD-10-CM

## 2025-06-18 ENCOUNTER — HOSPITAL ENCOUNTER (OUTPATIENT)
Dept: ULTRASOUND IMAGING | Age: 40
Discharge: HOME | End: 2025-06-18
Attending: OBSTETRICS & GYNECOLOGY
Payer: COMMERCIAL

## 2025-06-18 PROCEDURE — 76856 US EXAM PELVIC COMPLETE: CPT | Performed by: OBSTETRICS & GYNECOLOGY

## 2025-06-18 PROCEDURE — 93975 VASCULAR STUDY: CPT | Performed by: OBSTETRICS & GYNECOLOGY

## 2025-06-18 PROCEDURE — 76830 TRANSVAGINAL US NON-OB: CPT | Performed by: OBSTETRICS & GYNECOLOGY

## (undated) DIAGNOSIS — R74.8 ELEVATED LIVER ENZYMES: Primary | ICD-10-CM

## (undated) DIAGNOSIS — E78.5 HYPERLIPIDEMIA, UNSPECIFIED HYPERLIPIDEMIA TYPE: ICD-10-CM

## (undated) DEVICE — SOL  .9 1000ML BTL

## (undated) DEVICE — DRAPE C-ARM UNIVERSAL

## (undated) DEVICE — Device

## (undated) DEVICE — CHLORAPREP 26ML APPLICATOR

## (undated) DEVICE — STERILE POLYISOPRENE POWDER-FREE SURGICAL GLOVES: Brand: PROTEXIS

## (undated) DEVICE — TROCAR: Brand: KII FIOS FIRST ENTRY

## (undated) DEVICE — KENDALL SCD EXPRESS SLEEVES, KNEE LENGTH, MEDIUM: Brand: KENDALL SCD

## (undated) DEVICE — DISPOSABLE LAPAROSCOPIC CLIP APPLIER WITH 20 CLIPS.: Brand: EPIX® UNIVERSAL CLIP APPLIER

## (undated) DEVICE — SUTURE MONOCRYL 4-0 PS-2

## (undated) DEVICE — TROCAR: Brand: KII SHIELDED BLADED ACCESS SYSTEM

## (undated) DEVICE — VISUALIZATION SYSTEM: Brand: CLEARIFY

## (undated) DEVICE — OPEN-END FLEXI-TIP URETERAL CATHETER: Brand: FLEXI-TIP

## (undated) DEVICE — LAP CHOLE/APPY CDS-LF: Brand: MEDLINE INDUSTRIES, INC.

## (undated) DEVICE — ENDO MINI-SHEARS 5MM STD LENGT

## (undated) DEVICE — TISSUE RETRIEVAL SYSTEM: Brand: INZII RETRIEVAL SYSTEM

## (undated) DEVICE — TROCAR: Brand: KII® SLEEVE

## (undated) DEVICE — NITINOL WIRE STR 035

## (undated) DEVICE — MONOFILAMENT ABSORBABLE SUTURE: Brand: MAXON

## (undated) NOTE — Clinical Note
I had the pleasure of seeing David Briggs on 3/9/2022. Please see my attached note.     Eddie Lund MD FACS  [facility]

## (undated) NOTE — ED AVS SNAPSHOT
Raphael Mares   MRN: [id number]    Department:  [facility] Emergency Department   Date of Visit:  7/13/2019           Disclosure     Insurance plans vary and the physician(s) referred by the ER may not be covered by your plan.  Please contact your in tell this physician (or your personal doctor if your instructions are to return to your personal doctor) about any new or lasting problems. The primary care or specialist physician will see patients referred from the [facility] Emergency Department.  Severo Pastures

## (undated) NOTE — ED AVS SNAPSHOT
Lauri Olivas   MRN: [id number]    Department:  [facility] Emergency Department   Date of Visit:  8/4/2018           Disclosure     Insurance plans vary and the physician(s) referred by the ER may not be covered by your plan.  Please contact your ins tell this physician (or your personal doctor if your instructions are to return to your personal doctor) about any new or lasting problems. The primary care or specialist physician will see patients referred from the [facility] Emergency Department.  Jose Larkin

## (undated) NOTE — ED AVS SNAPSHOT
Lauri Olivas   MRN: [id number]    Department:  [facility] Emergency Department   Date of Visit:  3/10/2019           Disclosure     Insurance plans vary and the physician(s) referred by the ER may not be covered by your plan.  Please contact your in tell this physician (or your personal doctor if your instructions are to return to your personal doctor) about any new or lasting problems. The primary care or specialist physician will see patients referred from the [facility] Emergency Department.  Juni Martin

## (undated) NOTE — ED AVS SNAPSHOT
Daiana Mac   MRN: [id number]    Department:  [facility] Emergency Department   Date of Visit:  3/16/2019           Disclosure     Insurance plans vary and the physician(s) referred by the ER may not be covered by your plan.  Please contact your in tell this physician (or your personal doctor if your instructions are to return to your personal doctor) about any new or lasting problems. The primary care or specialist physician will see patients referred from the [facility] Emergency Department.  Effie Brown